# Patient Record
Sex: FEMALE | Race: WHITE | NOT HISPANIC OR LATINO | Employment: FULL TIME | URBAN - METROPOLITAN AREA
[De-identification: names, ages, dates, MRNs, and addresses within clinical notes are randomized per-mention and may not be internally consistent; named-entity substitution may affect disease eponyms.]

---

## 2017-03-27 NOTE — TELEPHONE ENCOUNTER
Was the patient seen in the last year in this department? Yes 10/05/2016    Does patient have an active prescription for medications requested? No     Received Request Via: Pharmacy

## 2017-03-27 NOTE — TELEPHONE ENCOUNTER
Janak- You have never prescribed a short acting insulin and the only one listed on med list is Novolog 70/30 mix but pharmacy is requesting just Novolog. Please refill as you see fit.

## 2017-03-27 NOTE — TELEPHONE ENCOUNTER
Pharmacy requesting refills on Novolog flexpen (D/C'd on med list), looks to have been replace with Novolog Mix.   Last OV 10/16, A1c checked 7/16, pt has met protocol.

## 2017-03-30 ENCOUNTER — TELEPHONE (OUTPATIENT)
Dept: MEDICAL GROUP | Facility: PHYSICIAN GROUP | Age: 32
End: 2017-03-30

## 2017-03-30 DIAGNOSIS — E10.8 TYPE 1 DIABETES MELLITUS WITH COMPLICATION (HCC): ICD-10-CM

## 2017-03-30 NOTE — TELEPHONE ENCOUNTER
1. Caller Name: Nita Jackman                                           Call Back Number: [phone] (home)         Patient approves a detailed voicemail message: N\A    Pt read from her medication bottle and she is taking Novolog flexpen inj 5x3ml - inject 5 units under the skin 3 times daily.  She is asking for a refill

## 2017-04-03 ENCOUNTER — TELEPHONE (OUTPATIENT)
Dept: MEDICAL GROUP | Facility: PHYSICIAN GROUP | Age: 32
End: 2017-04-03

## 2017-04-05 NOTE — TELEPHONE ENCOUNTER
Janak- From your notes I see that pt reestablished with you at this clinic. Pharmacy states that pt has always been on novolog flexpen but you recently sent a script for novolog 70/30 flexpen. Did you mean to make a change to therapy? Please clarify

## 2017-04-05 NOTE — TELEPHONE ENCOUNTER
Last refill request was sent for clarification per note in epic, pharmacy stated pt was on novolog flexpen and they received a novolog 70/30 flexpen rx

## 2017-04-06 RX ORDER — INSULIN ASPART 100 [IU]/ML
INJECTION, SOLUTION INTRAVENOUS; SUBCUTANEOUS
Qty: 45 ML | Refills: 3 | Status: SHIPPED | OUTPATIENT
Start: 2017-04-06 | End: 2018-06-13 | Stop reason: SDUPTHER

## 2017-04-06 NOTE — TELEPHONE ENCOUNTER
Pt called and states her novolog flex pen does not say 70/30 and she cannot remember ever having used a blend.

## 2017-07-13 DIAGNOSIS — F33.9 EPISODE OF RECURRENT MAJOR DEPRESSIVE DISORDER, UNSPECIFIED DEPRESSION EPISODE SEVERITY (HCC): ICD-10-CM

## 2017-07-13 RX ORDER — FLUOXETINE HYDROCHLORIDE 20 MG/1
40 CAPSULE ORAL DAILY
Qty: 180 CAP | Refills: 3 | Status: SHIPPED | OUTPATIENT
Start: 2017-07-13 | End: 2017-08-02

## 2017-08-02 ENCOUNTER — OFFICE VISIT (OUTPATIENT)
Dept: URGENT CARE | Facility: PHYSICIAN GROUP | Age: 32
End: 2017-08-02
Payer: COMMERCIAL

## 2017-08-02 VITALS
DIASTOLIC BLOOD PRESSURE: 78 MMHG | HEART RATE: 83 BPM | WEIGHT: 205 LBS | TEMPERATURE: 97.9 F | BODY MASS INDEX: 31.18 KG/M2 | OXYGEN SATURATION: 97 % | SYSTOLIC BLOOD PRESSURE: 122 MMHG | RESPIRATION RATE: 14 BRPM

## 2017-08-02 DIAGNOSIS — J01.90 ACUTE RHINOSINUSITIS: ICD-10-CM

## 2017-08-02 DIAGNOSIS — E10.9 TYPE 1 DIABETES MELLITUS WITHOUT COMPLICATION (HCC): ICD-10-CM

## 2017-08-02 PROCEDURE — 99214 OFFICE O/P EST MOD 30 MIN: CPT | Performed by: FAMILY MEDICINE

## 2017-08-02 RX ORDER — AMOXICILLIN AND CLAVULANATE POTASSIUM 875; 125 MG/1; MG/1
1 TABLET, FILM COATED ORAL 2 TIMES DAILY
Qty: 14 TAB | Refills: 0 | Status: SHIPPED | OUTPATIENT
Start: 2017-08-02 | End: 2017-08-09

## 2017-08-02 RX ORDER — PREDNISONE 10 MG/1
30 TABLET ORAL EVERY MORNING
Qty: 21 TAB | Refills: 0 | Status: SHIPPED | OUTPATIENT
Start: 2017-08-02 | End: 2017-08-09

## 2017-08-02 RX ORDER — PSEUDOEPHEDRINE HCL 120 MG/1
120 TABLET, FILM COATED, EXTENDED RELEASE ORAL EVERY MORNING
Qty: 20 TAB | Refills: 0 | Status: SHIPPED | OUTPATIENT
Start: 2017-08-02 | End: 2019-06-17

## 2017-08-02 ASSESSMENT — ENCOUNTER SYMPTOMS
ORTHOPNEA: 0
CHILLS: 0
SORE THROAT: 1
FEVER: 0
FOCAL WEAKNESS: 0
COUGH: 1
DIZZINESS: 0
SPUTUM PRODUCTION: 0

## 2017-08-02 NOTE — PROGRESS NOTES
Subjective:      Nita Jackman is a 32 y.o. female who presents with Nasal Congestion    Chief Complaint   Patient presents with   • Nasal Congestion     x 7 days R ear pain x 1 day        - This is a very pleasant 32 y.o. female with complaints of sinus congestion/pain and DC x 1 week. No NVFC. Occasional cough.    - Hx DM, sugars run 100's-low 200's. She knows how to titrate insulin.           ALLERGIES:  Percocet and Vicodin     PMH:  Past Medical History   Diagnosis Date   • Thyroid disorder    • Diabetes    • Retinopathy due to secondary diabetes (CMS-[facility])    • Asthma         MEDS:    Current outpatient prescriptions:   •  amoxicillin-clavulanate (AUGMENTIN) 875-125 MG Tab, Take 1 Tab by mouth 2 times a day for 7 days., Disp: 14 Tab, Rfl: 0  •  pseudoephedrine SR (SUDAFED 12 HOUR) 120 MG TABLET SR 12 HR, Take 1 Tab by mouth every morning., Disp: 20 Tab, Rfl: 0  •  predniSONE (DELTASONE) 10 MG Tab, Take 3 Tabs by mouth every morning for 7 days., Disp: 21 Tab, Rfl: 0  •  NOVOLOG FLEXPEN 100 UNIT/ML Solution Pen-injector solution for injection, INJECT 5 UNITS UNDER THE SKIN THREE TIMES DAILY PER PRESCRIBER'S INSTRUCTIONS. INSULIN DOSING REQUIRES INDIVIDUALIZATION, Disp: 45 mL, Rfl: 3  •  levothyroxine (SYNTHROID) 125 MCG Tab, Take 1 Tab by mouth Every morning on an empty stomach., Disp: 30 Tab, Rfl: 11  •  CYCLAFEM 1/35 1-35 MG-MCG per tablet, Take 1 Tab by mouth every day., Disp: , Rfl: 11  •  pantoprazole (PROTONIX) 40 MG Tablet Delayed Response, Take 1 Tab by mouth every day., Disp: , Rfl: 11  •  cetirizine (ZYRTEC) 10 MG TABS, Take 10 mg by mouth every day., Disp: , Rfl:     ** I have documented what I find to be significant in regards to past medical, social, family and surgical history  in my HPI or under PMH/PSH/FH review section, otherwise it is contributory **             HPI    Review of Systems   Constitutional: Negative for fever and chills.   HENT: Positive for congestion and sore throat.     Respiratory: Positive for cough. Negative for sputum production.    Cardiovascular: Negative for chest pain and orthopnea.   Neurological: Negative for dizziness and focal weakness.          Objective:     /78 mmHg  Pulse 83  Temp(Src) 36.6 °C (97.9 °F)  Resp 14  Wt 92.987 kg (205 lb)  SpO2 97%     Physical Exam   Constitutional: She appears well-developed. No distress.   HENT:   Head: Normocephalic and atraumatic.   Mouth/Throat: Oropharynx is clear and moist.   Eyes: Conjunctivae are normal.   Neck: Neck supple.   Cardiovascular: Regular rhythm.    No murmur heard.  Pulmonary/Chest: Effort normal and breath sounds normal. No respiratory distress.   Neurological: She is alert. She exhibits normal muscle tone.   Skin: Skin is warm and dry.   Psychiatric: She has a normal mood and affect. Judgment normal.   Nursing note and vitals reviewed.  boggy nasal mucosa w/ yellow DC            Assessment/Plan:         1. Acute rhinosinusitis  amoxicillin-clavulanate (AUGMENTIN) 875-125 MG Tab    pseudoephedrine SR (SUDAFED 12 HOUR) 120 MG TABLET SR 12 HR    predniSONE (DELTASONE) 10 MG Tab   2. Type 1 diabetes mellitus without complication (CMS-HCC)               Dx & d/c instructions discussed w/ patient and/or family members. Follow up w/ Prvt Dr or here in 3-4 days if not getting better, sooner if needed,  ER if worse and UC/PCP unavailable.        Possible side effects (i.e. Rash, GI upset/constipation, sedation, elevation of BP or sugars) of any medications given discussed.

## 2017-08-02 NOTE — Clinical Note
August 2, 2017         Patient: Nita Jackman   YOB: 1985   Date of Visit: 8/2/2017           To Whom it May Concern:    Nita Jackman was seen in my clinic on 8/2/2017. She may return to work 1 day.    If you have any questions or concerns, please don't hesitate to call.        Sincerely,           Ruben Floyd M.D.  Electronically Signed

## 2017-08-02 NOTE — MR AVS SNAPSHOT
Nita Jackman   2017 9:15 AM   Office Visit   MRN: [id number]    Department:  [facility]   Dept Phone:  [phone]    Description:  Female : 1985   Provider:  Ruben Floyd M.D.           Reason for Visit     Nasal Congestion x 7 days R ear pain x 1 day      Allergies as of 2017     Allergen Noted Reactions    Percocet [Oxycodone-Acetaminophen] 2011   Itching    And nausea    Vicodin [Hydrocodone-Acetaminophen] 10/29/2009         You were diagnosed with     Acute rhinosinusitis   [486518]         Vital Signs     Blood Pressure Pulse Temperature Respirations Weight Oxygen Saturation    122/78 mmHg 83 36.6 °C (97.9 °F) 14 92.987 kg (205 lb) 97%    Smoking Status                   Never Smoker            Basic Information     Date Of Birth Sex Race Ethnicity Preferred Language    1985 Female White Non- English      Your appointments     Sep 26, 2017  7:30 AM   NM-GASTRIC EMPTYING(TOUGAS METHOD) with [facility] NM [facility]   [facility]    [address] [place] [id number]   [phone]              Problem List              ICD-10-CM Priority Class Noted - Resolved    Encounter to establish care with new doctor Z71.89   2016 - Present    Type 1 diabetes mellitus without complication (CMS-HCC) E10.9   2016 - Present    Overweight E66.3   2016 - Present    Depression F32.9   2016 - Present    Sinusitis J32.9   10/5/2016 - Present    Abscess of scalp L02.811   10/5/2016 - Present    BMI 31.0-31.9,adult Z68.31   10/5/2016 - Present      Health Maintenance        Date Due Completion Dates    IMM HEP B VACCINE (1 of 3 - Primary Series) 1984 ---    DIABETES MONOFILAMENT / LE EXAM 1984 ---    RETINAL SCREENING 2002 ---    FASTING LIPID PROFILE 2002 ---    URINE ACR / MICROALBUMIN 2002 ---    IMM PNEUMOCOCCAL 19-64 (ADULT) MEDIUM RISK SERIES (1 of 1 - PPSV23) 2003 ---    PAP  SMEAR 4/23/2005 ---    SERUM CREATININE 3/15/2013 3/15/2012, 5/23/2011, 10/29/2009    A1C SCREENING 1/12/2017 7/12/2016    IMM INFLUENZA (1) 9/1/2017 ---    IMM DTaP/Tdap/Td Vaccine (2 - Td) 1/2/2025 1/2/2015            Current Immunizations     Tdap Vaccine 1/2/2015 12:05 PM      Below and/or attached are the medications your provider expects you to take. Review all of your home medications and newly ordered medications with your provider and/or pharmacist. Follow medication instructions as directed by your provider and/or pharmacist. Please keep your medication list with you and share with your provider. Update the information when medications are discontinued, doses are changed, or new medications (including over-the-counter products) are added; and carry medication information at all times in the event of emergency situations     Allergies:  PERCOCET - Itching     VICODIN - (reactions not documented)               Medications  Valid as of: August 02, 2017 -  9:26 AM    Generic Name Brand Name Tablet Size Instructions for use    Amoxicillin-Pot Clavulanate (Tab) AUGMENTIN 875-125 MG Take 1 Tab by mouth 2 times a day for 7 days.        Cetirizine HCl (Tab) ZYRTEC 10 MG Take 10 mg by mouth every day.        Insulin Aspart (Solution Pen-injector) NOVOLOG FLEXPEN 100 UNIT/ML INJECT 5 UNITS UNDER THE SKIN THREE TIMES DAILY PER PRESCRIBER'S INSTRUCTIONS. INSULIN DOSING REQUIRES INDIVIDUALIZATION        Levothyroxine Sodium (Tab) SYNTHROID 125 MCG Take 1 Tab by mouth Every morning on an empty stomach.        Norethindrone-Eth Estradiol (Tab) CYCLAFEM 1/35 1-35 MG-MCG Take 1 Tab by mouth every day.        Pantoprazole Sodium (Tablet Delayed Response) PROTONIX 40 MG Take 1 Tab by mouth every day.        PredniSONE (Tab) DELTASONE 10 MG Take 3 Tabs by mouth every morning for 7 days.        Pseudoephedrine HCl (TABLET SR 12 HR) SUDAFED  MG Take 1 Tab by mouth every morning.        .                 Medicines  prescribed today were sent to:     [id number]  [place], [place] - [address] AT [facility] [address]    [address] [place] [address]    Phone: [phone] Fax: [phone]    Open 24 Hours?: No      Medication refill instructions:       If your prescription bottle indicates you have medication refills left, it is not necessary to call your provider’s office. Please contact your pharmacy and they will refill your medication.    If your prescription bottle indicates you do not have any refills left, you may request refills at any time through one of the following ways: The online [employer] system (except Urgent Care), by calling your provider’s office, or by asking your pharmacy to contact your provider’s office with a refill request. Medication refills are processed only during regular business hours and may not be available until the next business day. Your provider may request additional information or to have a follow-up visit with you prior to refilling your medication.   *Please Note: Medication refills are assigned a new Rx number when refilled electronically. Your pharmacy may indicate that no refills were authorized even though a new prescription for the same medication is available at the pharmacy. Please request the medicine by name with the pharmacy before contacting your provider for a refill.           [employer] Access Code: Activation code not generated  Current [employer] Status: Active

## 2017-08-16 ENCOUNTER — TELEPHONE (OUTPATIENT)
Dept: MEDICAL GROUP | Facility: PHYSICIAN GROUP | Age: 32
End: 2017-08-16

## 2017-08-23 ENCOUNTER — TELEPHONE (OUTPATIENT)
Dept: MEDICAL GROUP | Facility: PHYSICIAN GROUP | Age: 32
End: 2017-08-23

## 2017-08-23 NOTE — TELEPHONE ENCOUNTER
ESTABLISHED PATIENT PRE-VISIT PLANNING     Note: Patient will not be contacted if there is no indication to call.     1.  Reviewed notes from the last few office visits within the medical group: Yes    2.  If any orders were placed at last visit or intended to be done for this visit (i.e. 6 mos follow-up), do we have Results/Consult Notes?        •  Labs - Labs ordered, completed and results were requested from  [facility].       •  Imaging - Imaging was not ordered at last office visit.       •  Referrals - No referrals were ordered at last office visit.    3. Is this appointment scheduled as a Hospital Follow-Up? No      5.  Patient is due for the following Health Maintenance Topics:   Health Maintenance Due   Topic Date Due   • IMM HEP B VACCINE (1 of 3 - Primary Series) 1985   • DIABETES MONOFILAMENT / LE EXAM  1985   • RETINAL SCREENING  04/23/2002   • FASTING LIPID PROFILE  04/23/2002   • URINE ACR / MICROALBUMIN  04/23/2002   • IMM PNEUMOCOCCAL 19-64 (ADULT) MEDIUM RISK SERIES (1 of 1 - PPSV23) 04/23/2003   • PAP SMEAR  04/23/2005   • SERUM CREATININE  03/15/2013   • A1C SCREENING  01/12/2017   • IMM INFLUENZA (1) 09/01/2017           6.  Patient was informed to arrive 15 min prior to their scheduled appointment and bring in their medication bottles.

## 2017-08-24 ENCOUNTER — OFFICE VISIT (OUTPATIENT)
Dept: MEDICAL GROUP | Facility: PHYSICIAN GROUP | Age: 32
End: 2017-08-24
Payer: COMMERCIAL

## 2017-08-24 VITALS
DIASTOLIC BLOOD PRESSURE: 80 MMHG | HEIGHT: 68 IN | WEIGHT: 206 LBS | RESPIRATION RATE: 16 BRPM | HEART RATE: 68 BPM | OXYGEN SATURATION: 98 % | BODY MASS INDEX: 31.22 KG/M2 | SYSTOLIC BLOOD PRESSURE: 112 MMHG | TEMPERATURE: 98 F

## 2017-08-24 DIAGNOSIS — F32.A DEPRESSION, UNSPECIFIED DEPRESSION TYPE: ICD-10-CM

## 2017-08-24 DIAGNOSIS — E10.9 TYPE 1 DIABETES MELLITUS WITHOUT COMPLICATION (HCC): ICD-10-CM

## 2017-08-24 PROCEDURE — 99214 OFFICE O/P EST MOD 30 MIN: CPT | Performed by: NURSE PRACTITIONER

## 2017-08-24 RX ORDER — FLUOXETINE HYDROCHLORIDE 20 MG/1
20 CAPSULE ORAL DAILY
COMMUNITY
End: 2018-07-31

## 2017-08-24 RX ORDER — BUPROPION HYDROCHLORIDE 150 MG/1
150 TABLET ORAL EVERY MORNING
Qty: 90 TAB | Refills: 3 | Status: SHIPPED | OUTPATIENT
Start: 2017-08-24 | End: 2018-08-22 | Stop reason: SDUPTHER

## 2017-08-24 ASSESSMENT — PATIENT HEALTH QUESTIONNAIRE - PHQ9
5. POOR APPETITE OR OVEREATING: 3 - NEARLY EVERY DAY
CLINICAL INTERPRETATION OF PHQ2 SCORE: 3
SUM OF ALL RESPONSES TO PHQ QUESTIONS 1-9: 15

## 2017-08-24 NOTE — ASSESSMENT & PLAN NOTE
Mood has been down, stressed out a lot.  Doesn't think prozac is working well enough.  Discussed adding wellbutrin.  Also discussed the possibility of seeing a counselor, specifically to discuss depression as it relates to chronic disease.

## 2017-08-24 NOTE — MR AVS SNAPSHOT
"Nita Jackman   2017 11:40 AM   Office Visit   MRN: [id number]    Department:  [facility]   Dept Phone:  [phone]    Description:  Female : 1985   Provider:  LORENZO Herring           Reason for Visit     Follow-Up labs       Allergies as of 2017     Allergen Noted Reactions    Percocet [Oxycodone-Acetaminophen] 2011   Itching    And nausea    Vicodin [Hydrocodone-Acetaminophen] 10/29/2009         You were diagnosed with     BMI 31.0-31.9,adult   [633498]       Depression, unspecified depression type   [4925081]       Type 1 diabetes mellitus without complication (CMS-HCC)   [524748]         Vital Signs     Blood Pressure Pulse Temperature Respirations Height Weight    112/80 mmHg 68 36.7 °C (98 °F) 16 1.727 m (5' 7.99\") 93.441 kg (206 lb)    Body Mass Index Oxygen Saturation Last Menstrual Period Breastfeeding? Smoking Status       31.33 kg/m2 98% 08/10/2017 No Never Smoker        Basic Information     Date Of Birth Sex Race Ethnicity Preferred Language    1985 Female White Non- English      Your appointments     Sep 26, 2017  7:30 AM   NM-GASTRIC EMPTYING(TOUGAS METHOD) with [facility] NM ECAM   [facility] - N[facility] ([facility])    [address] [place] [id number] [phone]              Problem List              ICD-10-CM Priority Class Noted - Resolved    Type 1 diabetes mellitus without complication (CMS-HCC) E10.9   2016 - Present    Overweight E66.3   2016 - Present    Depression F32.9   2016 - Present    BMI 31.0-31.9,adult Z68.31   10/5/2016 - Present      Health Maintenance        Date Due Completion Dates    IMM HEP B VACCINE (1 of 3 - Primary Series) 1984 ---    DIABETES MONOFILAMENT / LE EXAM 1984 ---    RETINAL SCREENING 2002 ---    FASTING LIPID PROFILE 2002 ---    URINE ACR / MICROALBUMIN 2002 ---    IMM PNEUMOCOCCAL 19-64 (ADULT) MEDIUM RISK SERIES (1 of 1 - " PPSV23) 4/23/2003 ---    PAP SMEAR 4/23/2005 ---    SERUM CREATININE 3/15/2013 3/15/2012, 5/23/2011, 10/29/2009    A1C SCREENING 1/12/2017 7/12/2016    IMM INFLUENZA (1) 9/1/2017 ---    IMM DTaP/Tdap/Td Vaccine (2 - Td) 1/2/2025 1/2/2015            Current Immunizations     Tdap Vaccine 1/2/2015 12:05 PM      Below and/or attached are the medications your provider expects you to take. Review all of your home medications and newly ordered medications with your provider and/or pharmacist. Follow medication instructions as directed by your provider and/or pharmacist. Please keep your medication list with you and share with your provider. Update the information when medications are discontinued, doses are changed, or new medications (including over-the-counter products) are added; and carry medication information at all times in the event of emergency situations     Allergies:  PERCOCET - Itching     VICODIN - (reactions not documented)               Medications  Valid as of: August 24, 2017 -  1:11 PM    Generic Name Brand Name Tablet Size Instructions for use    BuPROPion HCl (TABLET SR 24 HR) WELLBUTRIN  MG Take 1 Tab by mouth every morning.        Cetirizine HCl (Tab) ZYRTEC 10 MG Take 10 mg by mouth every day.        FLUoxetine HCl (Cap) PROZAC 20 MG Take 20 mg by mouth every day.        Insulin Aspart (Solution Pen-injector) NOVOLOG FLEXPEN 100 UNIT/ML INJECT 5 UNITS UNDER THE SKIN THREE TIMES DAILY PER PRESCRIBER'S INSTRUCTIONS. INSULIN DOSING REQUIRES INDIVIDUALIZATION        Levothyroxine Sodium (Tab) SYNTHROID 125 MCG Take 1 Tab by mouth Every morning on an empty stomach.        Norethindrone-Eth Estradiol (Tab) CYCLAFEM 1/35 1-35 MG-MCG Take 1 Tab by mouth every day.        Pantoprazole Sodium (Tablet Delayed Response) PROTONIX 40 MG Take 1 Tab by mouth every day.        Pseudoephedrine HCl (TABLET SR 12 HR) SUDAFED  MG Take 1 Tab by mouth every morning.        .                 Medicines  prescribed today were sent to:     [id number]  [place], [place] - [address] AT [facility] [address]    [address] [place] [address]    Phone: [phone] Fax: [phone]    Open 24 Hours?: No      Medication refill instructions:       If your prescription bottle indicates you have medication refills left, it is not necessary to call your provider’s office. Please contact your pharmacy and they will refill your medication.    If your prescription bottle indicates you do not have any refills left, you may request refills at any time through one of the following ways: The online [employer] system (except Urgent Care), by calling your provider’s office, or by asking your pharmacy to contact your provider’s office with a refill request. Medication refills are processed only during regular business hours and may not be available until the next business day. Your provider may request additional information or to have a follow-up visit with you prior to refilling your medication.   *Please Note: Medication refills are assigned a new Rx number when refilled electronically. Your pharmacy may indicate that no refills were authorized even though a new prescription for the same medication is available at the pharmacy. Please request the medicine by name with the pharmacy before contacting your provider for a refill.           [employer] Access Code: Activation code not generated  Current [employer] Status: Active

## 2017-08-24 NOTE — ASSESSMENT & PLAN NOTE
Currently on 40 units of Tresiba in the evening, and mealtime Novolog.  Sugars are typically in the 200s.  Had labs done a few months ago.  Discussed that I would like her sugars a little lower.  Patient understands.

## 2017-08-30 DIAGNOSIS — F32.A DEPRESSION, UNSPECIFIED DEPRESSION TYPE: ICD-10-CM

## 2017-09-04 DIAGNOSIS — E10.8 TYPE 1 DIABETES MELLITUS WITH COMPLICATION (HCC): ICD-10-CM

## 2017-09-13 NOTE — PROGRESS NOTES
Chief Complaint   Patient presents with   • Follow-Up     labs        HISTORY OF PRESENT ILLNESS: Patient is a 32 y.o. female established patient who presents today to discuss the following issues:    Type 1 diabetes mellitus without complication  Currently on 40 units of Tresiba in the evening, and mealtime Novolog.  Sugars are typically in the 200s.  Had labs done a few months ago.  Discussed that I would like her sugars a little lower.  Patient understands.    Depression  Mood has been down, stressed out a lot.  Doesn't think prozac is working well enough.  Discussed adding wellbutrin.  Also discussed the possibility of seeing a counselor, specifically to discuss depression as it relates to chronic disease.    BMI 31.0-31.9,adult  Patient is aware of BMI elevation.  Brief discussion of diet, exercise, and lifestyle modification.        Patient Active Problem List    Diagnosis Date Noted   • BMI 31.0-31.9,adult 10/05/2016   • Type 1 diabetes mellitus without complication (CMS-[facility]) 07/12/2016   • Depression 07/12/2016       Allergies:Percocet [oxycodone-acetaminophen] and Vicodin [hydrocodone-acetaminophen]    Current Outpatient Prescriptions   Medication Sig Dispense Refill   • fluoxetine (PROZAC) 20 MG Cap Take 20 mg by mouth every day.     • buPROPion (WELLBUTRIN XL) 150 MG XL tablet Take 1 Tab by mouth every morning. 90 Tab 3   • NOVOLOG FLEXPEN 100 UNIT/ML Solution Pen-injector solution for injection INJECT 5 UNITS UNDER THE SKIN THREE TIMES DAILY PER PRESCRIBER'S INSTRUCTIONS. INSULIN DOSING REQUIRES INDIVIDUALIZATION 45 mL 3   • levothyroxine (SYNTHROID) 125 MCG Tab Take 1 Tab by mouth Every morning on an empty stomach. 30 Tab 11   • CYCLAFEM 1/35 1-35 MG-MCG per tablet Take 1 Tab by mouth every day.  11   • pantoprazole (PROTONIX) 40 MG Tablet Delayed Response Take 1 Tab by mouth every day.  11   • cetirizine (ZYRTEC) 10 MG TABS Take 10 mg by mouth every day.     • glucose blood strip 1 Strip by Other route  "as needed. 100 Strip 99   • pseudoephedrine SR (SUDAFED 12 HOUR) 120 MG TABLET SR 12 HR Take 1 Tab by mouth every morning. 20 Tab 0     No current facility-administered medications for this visit.        Social History   Substance Use Topics   • Smoking status: Never Smoker   • Smokeless tobacco: Never Used   • Alcohol use 0.6 oz/week     1 Glasses of wine per week       Family Status   Relation Status   • Mother Alive   • Father Alive     Family History   Problem Relation Age of Onset   • Hyperlipidemia Father    • Diabetes     • Hypertension     • Stroke         Review of Systems:   Constitutional: Negative for fever, chills, weight loss and malaise/fatigue.   HENT: Negative for ear pain, nosebleeds, congestion, sore throat and neck pain.    Eyes: Negative for blurred vision.   Respiratory: Negative for cough, sputum production, shortness of breath and wheezing.    Cardiovascular: Negative for chest pain, palpitations, orthopnea and leg swelling.   Gastrointestinal: Negative for heartburn, nausea, vomiting and abdominal pain.   Genitourinary: Negative for dysuria, urgency and frequency.   Musculoskeletal: Negative for myalgias, joint pain, and back pain.  Skin: Negative for rash and itching.   Neurological: Negative for dizziness, tingling, tremors, sensory change, focal weakness and headaches.   Endo/Heme/Allergies: Does not bruise/bleed easily.   Psychiatric/Behavioral: Positive for depression and anxiety.  Denies SI/HI.  All other systems reviewed and are negative except as in HPI.    Exam:  Blood pressure 112/80, pulse 68, temperature 36.7 °C (98 °F), resp. rate 16, height 1.727 m (5' 7.99\"), weight 93.4 kg (206 lb), last menstrual period 08/10/2017, SpO2 98 %, not currently breastfeeding.  General:  Well nourished, well developed female in NAD  Head: Grossly normal.  Neck: Supple without JVD or bruit. Thyroid is not enlarged.  Pulmonary: Clear to ausculation. Normal effort. No rales, ronchi, or " wheezing.  Cardiovascular: Regular rate and rhythm without murmur.   Extremities: No clubbing, cyanosis, or edema.  Skin: Intact with no obvious rashes or lesions.  Neuro: Grossly intact.  Psych: Alert and oriented x 3.  Mood and affect appropriate.    Medical decision-making and discussion: Nita is here today for follow-up.  She was given a prescription for wellbutrin, and she will reach out on MyChart to let me know how she is doing.  She will follow-up here as needed.          Assessment/Plan:  1. BMI 31.0-31.9,adult  Patient identified as having weight management issue.  Appropriate orders and counseling given.   2. Depression, unspecified depression type  Patient has been identified as being depressed and appropriate orders and counseling have been given    buPROPion (WELLBUTRIN XL) 150 MG XL tablet   3. Type 1 diabetes mellitus without complication (CMS-HCC)         Return if symptoms worsen or fail to improve.    Please note that this dictation was created using voice recognition software. I have made every reasonable attempt to correct obvious errors, but I expect that there are errors of grammar and possibly content that I did not discover before finalizing the note.

## 2017-09-18 ENCOUNTER — TELEPHONE (OUTPATIENT)
Dept: MEDICAL GROUP | Facility: PHYSICIAN GROUP | Age: 32
End: 2017-09-18

## 2017-09-18 DIAGNOSIS — E10.8 TYPE 1 DIABETES MELLITUS WITH COMPLICATION (HCC): ICD-10-CM

## 2017-09-18 NOTE — TELEPHONE ENCOUNTER
VOICEMAIL  1. Caller Name: Nita Jackman                        Call Back Number: [phone] (home)       2. Message: pt states she asked for two glucometers and test strips at beginning of month and she has not heard anything since.  She states the one she broke was an Accu-Check    3. Patient approves office to leave a detailed voicemail/MyChart message: N\A

## 2017-09-26 ENCOUNTER — HOSPITAL ENCOUNTER (OUTPATIENT)
Dept: RADIOLOGY | Facility: MEDICAL CENTER | Age: 32
End: 2017-09-26
Attending: INTERNAL MEDICINE
Payer: COMMERCIAL

## 2017-10-11 ENCOUNTER — NON-PROVIDER VISIT (OUTPATIENT)
Dept: HEALTH INFORMATION MANAGEMENT | Facility: MEDICAL CENTER | Age: 32
End: 2017-10-11
Payer: COMMERCIAL

## 2017-10-11 VITALS — WEIGHT: 207.5 LBS | HEIGHT: 68 IN | BODY MASS INDEX: 31.45 KG/M2

## 2017-10-11 DIAGNOSIS — E10.8 TYPE 1 DIABETES MELLITUS WITH COMPLICATION (HCC): ICD-10-CM

## 2017-10-11 PROCEDURE — 97802 MEDICAL NUTRITION INDIV IN: CPT | Performed by: DIETITIAN, REGISTERED

## 2017-10-11 NOTE — PROGRESS NOTES
"10/11/2017 32 y.o.   Referring Provider: LORENZO Duron       Time in/out:  2:30-3:30pm     Anthropometrics/Objective  Weight: 207.8lb   Stated Goal Weight: -40lb (160lb)  Estimated Daily Caloric needs: 1700Kcal based on [place]    See comprehensive patient history form for further information     Subjective:  Pt states she has had Type 1 Diabetes since she was 7.5 years old. She used to attend diabetes camp and has had ample education on carb counting, however would like a review. Admits that her diet has gotten off track and she states that she has a unhealthy relationship with food. She is asking for a set meal plan with exchanges, as she did at diabetes in [place]. She feels that will help her to learn to \"eat to live\" and provide more structure. She wants to lose weight. Admits to having an addiction to sweets and is eating them daily right now.   Has tried gluten free diet and low carb diets but they have not worked for her long term.   Reports checking FSBS 1-2x a day. Worries she doesn't have enough strips to check more often than that. Reports levels have been really high and then also has a couple lows.   Insulin -- Tresiba 40units HS, and Novolog 1:12 with meals (carb counting)      Nutrition Diagnosis (PES Statement)  · Altered nutrition related lab values related to endocrine dysfunction as evidenced by HgbA1c of 10.7 per referral notes   ·     Client history:  Condition(s) associated with a diagnosis or treatment (specify) Type 1 Diabetes, Hypothyroidism.     Biochemical data, medical test and procedures  Lab Results   Component Value Date/Time    HBA1C 12.2 07/12/2016 10:15 AM   @  Lab Results   Component Value Date/Time    POCGLUCOSE 240 (H) 03/15/2012 04:05 PM     No results found for: CHOLSTRLTOT, LDL, HDL, TRIGLYCERIDE      Nutrition Intervention  Nutrition Prescription  Recommended Daily Kcals 1700kcal   Recommended Daily Protein: 95-100g     Meal and Snack  Recommend a general/healthful " diet with carb counting  Meal plan:   8 starch, 3 fruit, 10 protein, 4 fat, 4 veggies     Comprehensive Nutrition education Instruction or training leading to in-depth nutrition related knowledge about:  Benefits to following meal plan, Combine carb, protein and fat at each meal, Meal timing and spacing, Menu Planning, Metabolism of carb, protein, fat, Physical activity/exercise, Portion control, Sweets and alcohol in moderation and Label Reading    Monitoring & Evaluation Plan    Behavioral-Environmental:  Behavior : Begin to measure foods out with measuring cups to verify portions.   Check FSBS 3 times a day. Fasting and before a meal and HS.     Food / Nutrient Intake:  Food intake : Follow 1700kcal meal plan which will provide ~45% carb, 25% protein, 30% fat. Consume 3 meals and 2 snacks. Limit sweets to no more than 3 per week.     Physical Signs / Symptoms:  HbA1c profiles <7% or per MD guidelines. and Weight change -0.5-1lb per week     Assessment Notes:  Provide pt with a meal plan and reviewed the exchanges. This meal plan will provide ~45g total carbs per meal and 15 g per snack. It will be higher in protein to help with satiety. Pt will continue to carb count and follow current insulin to carb ratio as rx'd by her MD (1:12g CHO).   If despite getting better control and consistency in her diet she is still having poor glycemic control, recommend that pt be referred to Endocrinology and she may benefit from a continuous glucose monitor (CGM).     Follow up 3 weeks   Brittany Hastings, RD, LD, CDE  [phone]

## 2017-10-31 ENCOUNTER — NON-PROVIDER VISIT (OUTPATIENT)
Dept: HEALTH INFORMATION MANAGEMENT | Facility: MEDICAL CENTER | Age: 32
End: 2017-10-31
Payer: COMMERCIAL

## 2017-10-31 VITALS — HEIGHT: 68 IN | WEIGHT: 212.6 LBS | BODY MASS INDEX: 32.22 KG/M2

## 2017-10-31 DIAGNOSIS — E10.9 TYPE 1 DIABETES MELLITUS WITHOUT COMPLICATION (HCC): ICD-10-CM

## 2017-10-31 PROCEDURE — 97803 MED NUTRITION INDIV SUBSEQ: CPT | Performed by: DIETITIAN, REGISTERED

## 2017-10-31 NOTE — PROGRESS NOTES
"Nutrition Reassess    10/31/2017    Janak GALLO [place], OLI.   32 y.o.   Time: in/out 2:00-2:30pm       Subjective:   Nita states that she has been following the meal plan well for the past few weeks. However last week she was having mood swings and cravings surrounding her period which resulted in over eating and indulging in sweets and late night snacking.   She was feeling much better when she was following the meal plan and saw an improvement in her blood sugars as well. FSBS  Fasting.   She continues to count carbs and use 1:12 insulin to carb ratio at B and L, however at dinner is trying 1:15 bc she has had a couple lows blood sugars over night. Pt doesn't often eat after dinner.   Has limited alcohol consumption but last week had 1-2 alcoholic beverages.   She would like to get back to her meal plan. Is focusing on protein, vegetables, fruits, and whole grains    Anthropometrics/Objective    Vitals:    10/31/17 1443   Weight: 96.4 kg (212 lb 9.6 oz)   Height: 1.727 m (5' 8\")     Body mass index is 32.33 kg/m².    Previous weight/date: 207.8lb  Change : +4.8lb        Stated Goal Weight: -40lb (160lb)   BG Values: 95-110mg/dL fasting   Medication changes: none     Diet Recall  Time   7am :B  Protein shake with powder, yogurt, almond milk, and 1 serving fruit    10am :S  Nuts or yogurt or cheese    12pm :L  1 wheat tortilla with meat and cheese and mustard    4pm:S  Same as above    6-6:30 pm :D  Chicken with vegetables and all portion of rice or starchy vegetable like peas or sweet potato    :S  None     ReAssesment/Notes:  Nita has gained some weight however is on her menstrual cycle and is having bloating today. She also has been eating more sweets and larger portions at meals due to craving and hormonal influence. However prior to this past week she was feeling very good on a healthy eating pattern of 3 meals and 2 snacks. She realized how her energy and blood sugars were both improved by eating " more balanced meals and not waiting til after work to have all her calories. She is motivated to get back on track and does feel the meal plan is effective for her. It will just take some time to work on the kinks and make the behavior changes more permanent. We discussed getting back on track today. And suggested ways to deal with the cravings and hormonal mood changes. We will meet during the Holidays as well to offer support and accountability.     Follow-up: 4-6 weeks

## 2017-11-03 ENCOUNTER — OFFICE VISIT (OUTPATIENT)
Dept: BEHAVIORAL HEALTH | Facility: PHYSICIAN GROUP | Age: 32
End: 2017-11-03
Payer: COMMERCIAL

## 2017-11-03 DIAGNOSIS — F32.A DEPRESSION, UNSPECIFIED DEPRESSION TYPE: ICD-10-CM

## 2017-11-03 PROCEDURE — 90791 PSYCH DIAGNOSTIC EVALUATION: CPT | Performed by: SOCIAL WORKER
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
## 2017-11-12 DIAGNOSIS — Z76.89 ENCOUNTER TO ESTABLISH CARE WITH NEW DOCTOR: ICD-10-CM

## 2017-11-13 RX ORDER — LEVOTHYROXINE SODIUM 0.12 MG/1
TABLET ORAL
Qty: 90 TAB | Refills: 1 | Status: SHIPPED | OUTPATIENT
Start: 2017-11-13 | End: 2018-05-20 | Stop reason: SDUPTHER

## 2018-01-08 ENCOUNTER — TELEPHONE (OUTPATIENT)
Dept: MEDICAL GROUP | Facility: PHYSICIAN GROUP | Age: 33
End: 2018-01-08

## 2018-01-08 DIAGNOSIS — E10.8 TYPE 1 DIABETES MELLITUS WITH COMPLICATION (HCC): ICD-10-CM

## 2018-01-08 NOTE — TELEPHONE ENCOUNTER
----- Message from Nita Jackman sent at 1/6/2018  2:44 PM PST -----  Regarding: Prescription Question  Contact: [phone]  Gerardo fisher,     So when you prescribed me my new glucometer, it didn't come with strips. I've been buying them from [employer] but they are getting expensive. Can you please request a 3 month supply or the most you can do through my insurance? I have the Freestyle lite.  Hope you all had a Merry Eusebio and Happy New Year!  Thanks, Nita

## 2018-01-16 ENCOUNTER — TELEPHONE (OUTPATIENT)
Dept: MEDICAL GROUP | Facility: PHYSICIAN GROUP | Age: 33
End: 2018-01-16

## 2018-01-29 NOTE — TELEPHONE ENCOUNTER
Her insurance may have changed their formulary.  She will need to find out which brand is now preferred.  Thanks.

## 2018-04-16 NOTE — TELEPHONE ENCOUNTER
*Tresiba is currently D/C'd on pt med list*  Was the patient seen in the last year in this department? Yes     Does patient have an active prescription for medications requested? No     Received Request Via: Pharmacy    Pt met protocol?: Yes     Last OV 08/2017  Lab Results   Component Value Date/Time    HBA1C 12.2 07/12/2016 10:15 AM      No results found for: CHOLSTRLTOT, TRIGLYCERIDE, HDL, LDL, CHOLHDLRAT, NONHDL

## 2018-04-17 RX ORDER — INSULIN DEGLUDEC 100 U/ML
INJECTION, SOLUTION SUBCUTANEOUS
Qty: 15 PEN | Refills: 3 | Status: SHIPPED | OUTPATIENT
Start: 2018-04-17 | End: 2018-11-30 | Stop reason: SDUPTHER

## 2018-05-20 ENCOUNTER — TELEPHONE (OUTPATIENT)
Dept: MEDICAL GROUP | Facility: PHYSICIAN GROUP | Age: 33
End: 2018-05-20

## 2018-05-20 DIAGNOSIS — E03.9 HYPOTHYROIDISM, UNSPECIFIED TYPE: ICD-10-CM

## 2018-05-20 DIAGNOSIS — E10.9 TYPE 1 DIABETES MELLITUS WITHOUT COMPLICATION (HCC): ICD-10-CM

## 2018-05-20 DIAGNOSIS — Z76.89 ENCOUNTER TO ESTABLISH CARE WITH NEW DOCTOR: ICD-10-CM

## 2018-05-21 RX ORDER — LEVOTHYROXINE SODIUM 0.12 MG/1
TABLET ORAL
Qty: 90 TAB | Refills: 0 | Status: SHIPPED | OUTPATIENT
Start: 2018-05-21 | End: 2018-09-04 | Stop reason: SDUPTHER

## 2018-05-21 NOTE — TELEPHONE ENCOUNTER
Please remind pt to get labs done. If pt uses outside labs please send. Will send 3 months to pharmacy.

## 2018-05-21 NOTE — TELEPHONE ENCOUNTER
Was the patient seen in the last year in this department? Yes     Does patient have an active prescription for medications requested? No     Received Request Via: Pharmacy    Pt met protocol?: Yes     Last OV 08/2017  No results found for: TSH

## 2018-06-14 ENCOUNTER — TELEPHONE (OUTPATIENT)
Dept: MEDICAL GROUP | Facility: PHYSICIAN GROUP | Age: 33
End: 2018-06-14

## 2018-06-14 RX ORDER — INSULIN ASPART 100 [IU]/ML
INJECTION, SOLUTION INTRAVENOUS; SUBCUTANEOUS
Qty: 45 ML | Refills: 0 | Status: SHIPPED | OUTPATIENT
Start: 2018-06-14 | End: 2018-10-19 | Stop reason: SDUPTHER

## 2018-06-14 NOTE — TELEPHONE ENCOUNTER
Last seen by PCP 8/17. Will send 1 fill to pharmacy.  Please advise patient to make an appointment for a follow up and get labs done for further refills.  On   07/16  Lab Results   Component Value Date/Time    HBA1C 12.2 07/12/2016 10:15 AM      No results found for: MICROALBCALC, MALBCRT, MALBEXCR, LLTXVS62, MICROALBUR, MICRALB, UMICROALBUM, MICROALBTIM   Lab Results   Component Value Date/Time    ALKPHOSPHAT 69 10/29/2009 01:00 PM    ASTSGOT 11 (L) 10/29/2009 01:00 PM    ALTSGPT 9 10/29/2009 01:00 PM    TBILIRUBIN 0.5 10/29/2009 01:00 PM

## 2018-06-14 NOTE — TELEPHONE ENCOUNTER
Received prior auth for pt's Novolog.  States preferred alternative is Humalog.  If change not appropriate can do prior auth.

## 2018-06-18 NOTE — TELEPHONE ENCOUNTER
Please proceed with prior auth.  Patient is a Type 1 diabetic who needs to stay on the medications that she is stable on.  Thanks!

## 2018-06-21 NOTE — TELEPHONE ENCOUNTER
FINAL PRIOR AUTHORIZATION STATUS:    1.  Name of Medication & Dose: Novolog Flexpen     2. Prior Auth Status: Approved through 06/21/2020     3. Action Taken: Pharmacy Notified: yes Patient Notified: no

## 2018-06-23 LAB
ALBUMIN SERPL-MCNC: 4 G/DL (ref 3.5–5.5)
ALBUMIN/CREAT UR: 8.7 MG/G CREAT (ref 0–30)
ALBUMIN/GLOB SERPL: 1.5 {RATIO} (ref 1.2–2.2)
ALP SERPL-CCNC: 93 IU/L (ref 39–117)
ALT SERPL-CCNC: 12 IU/L (ref 0–32)
AST SERPL-CCNC: 13 IU/L (ref 0–40)
BILIRUB SERPL-MCNC: 0.3 MG/DL (ref 0–1.2)
BUN SERPL-MCNC: 14 MG/DL (ref 6–20)
BUN/CREAT SERPL: 16 (ref 9–23)
CALCIUM SERPL-MCNC: 9.4 MG/DL (ref 8.7–10.2)
CHLORIDE SERPL-SCNC: 104 MMOL/L (ref 96–106)
CO2 SERPL-SCNC: 23 MMOL/L (ref 20–29)
CREAT SERPL-MCNC: 0.9 MG/DL (ref 0.57–1)
CREAT UR-MCNC: 81.6 MG/DL
GFR SERPLBLD CREATININE-BSD FMLA CKD-EPI: 84 ML/MIN/1.73
GFR SERPLBLD CREATININE-BSD FMLA CKD-EPI: 96 ML/MIN/1.73
GLOBULIN SER CALC-MCNC: 2.7 G/DL (ref 1.5–4.5)
GLUCOSE SERPL-MCNC: 126 MG/DL (ref 65–99)
HBA1C MFR BLD: 10 % (ref 4.8–5.6)
MICROALBUMIN UR-MCNC: 7.1 UG/ML
POTASSIUM SERPL-SCNC: 4.7 MMOL/L (ref 3.5–5.2)
PROT SERPL-MCNC: 6.7 G/DL (ref 6–8.5)
SODIUM SERPL-SCNC: 139 MMOL/L (ref 134–144)
T4 FREE SERPL-MCNC: 1.48 NG/DL (ref 0.82–1.77)
TSH SERPL DL<=0.005 MIU/L-ACNC: 1.27 UIU/ML (ref 0.45–4.5)

## 2018-07-31 ENCOUNTER — OFFICE VISIT (OUTPATIENT)
Dept: MEDICAL GROUP | Facility: PHYSICIAN GROUP | Age: 33
End: 2018-07-31
Payer: COMMERCIAL

## 2018-07-31 VITALS
OXYGEN SATURATION: 97 % | BODY MASS INDEX: 32.58 KG/M2 | TEMPERATURE: 97.7 F | SYSTOLIC BLOOD PRESSURE: 150 MMHG | HEART RATE: 86 BPM | WEIGHT: 215 LBS | RESPIRATION RATE: 16 BRPM | HEIGHT: 68 IN | DIASTOLIC BLOOD PRESSURE: 80 MMHG

## 2018-07-31 DIAGNOSIS — F32.A DEPRESSION, UNSPECIFIED DEPRESSION TYPE: ICD-10-CM

## 2018-07-31 DIAGNOSIS — E10.49 OTHER DIABETIC NEUROLOGICAL COMPLICATION ASSOCIATED WITH TYPE 1 DIABETES MELLITUS (HCC): ICD-10-CM

## 2018-07-31 DIAGNOSIS — E10.9 TYPE 1 DIABETES MELLITUS WITHOUT COMPLICATION (HCC): ICD-10-CM

## 2018-07-31 PROBLEM — E11.40 DIABETIC NEUROPATHY (HCC): Status: ACTIVE | Noted: 2018-07-31

## 2018-07-31 PROCEDURE — 99214 OFFICE O/P EST MOD 30 MIN: CPT | Performed by: NURSE PRACTITIONER

## 2018-07-31 RX ORDER — GABAPENTIN 300 MG/1
300 CAPSULE ORAL 3 TIMES DAILY PRN
Qty: 90 CAP | Refills: 5 | Status: SHIPPED | OUTPATIENT
Start: 2018-07-31 | End: 2019-08-06 | Stop reason: SDUPTHER

## 2018-07-31 ASSESSMENT — PATIENT HEALTH QUESTIONNAIRE - PHQ9
CLINICAL INTERPRETATION OF PHQ2 SCORE: 2
SUM OF ALL RESPONSES TO PHQ QUESTIONS 1-9: 8
5. POOR APPETITE OR OVEREATING: 3 - NEARLY EVERY DAY

## 2018-08-01 NOTE — ASSESSMENT & PLAN NOTE
Has been emotional since her dog passed away, but does not want to go back on prozac. Denies SI/HI.

## 2018-08-01 NOTE — PROGRESS NOTES
Chief Complaint   Patient presents with   • Neurological Problem       HISTORY OF PRESENT ILLNESS: Patient is a 33 y.o. female established patient who presents today to discuss the following issues:    Depression  Has been emotional since her dog passed away, but does not want to go back on prozac. Denies SI/HI.    Type 1 diabetes mellitus without complication  Stress has been up and sugars have been elevated.  Will get back on track.    BMI 32.0-32.9,adult  Patient is aware of BMI elevation.  Brief discussion of diet, exercise, and lifestyle modification.      Diabetic neuropathy (HCC)  Her feet bother her all the time.  Admits to taking her dog's gabapentin and it worked well.  Discussed plan to titrate dose.      Patient Active Problem List    Diagnosis Date Noted   • Diabetic neuropathy ([facility]) 07/31/2018   • BMI 32.0-32.9,adult 10/05/2016   • Type 1 diabetes mellitus without complication ([facility]) 07/12/2016   • Depression 07/12/2016       Allergies:Percocet [oxycodone-acetaminophen] and Vicodin [hydrocodone-acetaminophen]    Current Outpatient Prescriptions   Medication Sig Dispense Refill   • gabapentin (NEURONTIN) 300 MG Cap Take 1 Cap by mouth 3 times a day as needed. 90 Cap 5   • NOVOLOG FLEXPEN 100 UNIT/ML Solution Pen-injector solution for injection INJECT 5 UNITS UNDER THE SKIN THREE TIMES DAILY, PER PRESCRIBERS INSTRUCTIONS 45 mL 0   • levothyroxine (SYNTHROID) 125 MCG Tab TAKE 1 TABLET BY MOUTH EVERY MORNING ON AN EMPTY STOMACH 90 Tab 0   • TRESIBA FLEXTOUCH 100 UNIT/ML Solution Pen-injector INJECT 40 UNITS UNDER THE SKIN AS INSTRUCTED EVERY NIGHT AT BEDTIME 15 PEN 3   • Blood Glucose Monitoring Suppl Supplies Misc Test strips order: Test strips for Abbott Freestyle Lite meter. Sig: use 3 times a day and prn ssx high or low sugar #300 RF x 3 300 Strip 3   • glucose blood strip 1 Strip by Other route as needed. 100 Strip 99   • buPROPion (WELLBUTRIN XL) 150 MG XL tablet Take 1 Tab by mouth every morning. 90  Tab 3   • pseudoephedrine SR (SUDAFED 12 HOUR) 120 MG TABLET SR 12 HR Take 1 Tab by mouth every morning. 20 Tab 0   • CYCLAFEM 1/35 1-35 MG-MCG per tablet Take 1 Tab by mouth every day.  11   • pantoprazole (PROTONIX) 40 MG Tablet Delayed Response Take 1 Tab by mouth every day.  11   • cetirizine (ZYRTEC) 10 MG TABS Take 10 mg by mouth every day.       No current facility-administered medications for this visit.        Social History   Substance Use Topics   • Smoking status: Never Smoker   • Smokeless tobacco: Never Used   • Alcohol use 0.6 oz/week     1 Glasses of wine per week       Family Status   Relation Status   • Mo Alive   • Fa Alive   • Unknown (Not Specified)   • Unknown (Not Specified)   • Unknown (Not Specified)     Family History   Problem Relation Age of Onset   • Hyperlipidemia Father    • Diabetes Unknown    • Hypertension Unknown    • Stroke Unknown        Review of Systems:   Constitutional: Negative for fever, chills, weight loss and malaise/fatigue.   HENT: Negative for ear pain, nosebleeds, congestion, sore throat and neck pain.    Eyes: Negative for blurred vision.   Respiratory: Negative for cough, sputum production, shortness of breath and wheezing.    Cardiovascular: Negative for chest pain, palpitations, orthopnea and leg swelling.   Gastrointestinal: Negative for heartburn, nausea, vomiting and abdominal pain.   Genitourinary: Negative for dysuria, urgency and frequency.   Musculoskeletal: Negative for myalgias, joint pain, and back pain.  Skin: Negative for rash and itching.   Neurological: Negative for dizziness, tingling, tremors, sensory change, focal weakness and headaches. Positive for neuropathy.  Endo/Heme/Allergies: Does not bruise/bleed easily.   Psychiatric/Behavioral: Negative for depression, suicidal ideas and memory loss.  The patient is not nervous/anxious and does not have insomnia.    All other systems reviewed and are negative except as in HPI.    Exam:  Blood pressure  "150/80, pulse 86, temperature 36.5 °C (97.7 °F), resp. rate 16, height 1.727 m (5' 8\"), weight 97.5 kg (215 lb), SpO2 97 %.  General:  Well nourished, well developed female in NAD  Head: Grossly normal.  Neck: Supple without JVD or bruit. Thyroid is not enlarged.  Pulmonary: Clear to ausculation. Normal effort. No rales, ronchi, or wheezing.  Cardiovascular: Regular rate and rhythm without murmur.   Abdomen:  Soft, nontender, nondistended.  Extremities: No clubbing, cyanosis, or edema.  Skin: Intact with no obvious rashes or lesions.  Neuro: Grossly intact.  Psych: Alert and oriented x 3.  Mood and affect appropriate.    Medical decision-making and discussion: Nita is here today for follow-up.  A prescription for gabapentin was sent to her pharmacy, and she will follow-up here as needed.          Assessment/Plan:  1. BMI 32.0-32.9,adult  Patient identified as having weight management issue.  Appropriate orders and counseling given.   2. Depression, unspecified depression type     3. Other diabetic neurological complication associated with type 1 diabetes mellitus (HCC)  gabapentin (NEURONTIN) 300 MG Cap   4. Type 1 diabetes mellitus without complication (HCC)         Return if symptoms worsen or fail to improve.    Please note that this dictation was created using voice recognition software. I have made every reasonable attempt to correct obvious errors, but I expect that there are errors of grammar and possibly content that I did not discover before finalizing the note.  "

## 2018-08-01 NOTE — ASSESSMENT & PLAN NOTE
Her feet bother her all the time.  Admits to taking her dog's gabapentin and it worked well.  Discussed plan to titrate dose.

## 2018-08-22 DIAGNOSIS — F32.A DEPRESSION, UNSPECIFIED DEPRESSION TYPE: ICD-10-CM

## 2018-08-23 RX ORDER — BUPROPION HYDROCHLORIDE 150 MG/1
TABLET ORAL
Qty: 90 TAB | Refills: 0 | Status: SHIPPED | OUTPATIENT
Start: 2018-08-23 | End: 2018-11-29 | Stop reason: SDUPTHER

## 2018-08-23 NOTE — TELEPHONE ENCOUNTER
Was the patient seen in the last year in this department? Yes    Does patient have an active prescription for medications requested? No     Received Request Via: Pharmacy    Pt met protocol?: Yes     Last OV 07/2018

## 2018-09-04 DIAGNOSIS — Z76.89 ENCOUNTER TO ESTABLISH CARE WITH NEW DOCTOR: ICD-10-CM

## 2018-09-05 DIAGNOSIS — E10.8 TYPE 1 DIABETES MELLITUS WITH COMPLICATION (HCC): ICD-10-CM

## 2018-09-05 RX ORDER — ALBUTEROL SULFATE 90 UG/1
2 AEROSOL, METERED RESPIRATORY (INHALATION) EVERY 4 HOURS PRN
Qty: 1 INHALER | Refills: 3 | Status: SHIPPED | OUTPATIENT
Start: 2018-09-05

## 2018-09-06 RX ORDER — LEVOTHYROXINE SODIUM 0.12 MG/1
TABLET ORAL
Qty: 90 TAB | Refills: 12 | Status: SHIPPED | OUTPATIENT
Start: 2018-09-06 | End: 2019-09-24 | Stop reason: SDUPTHER

## 2018-09-06 NOTE — TELEPHONE ENCOUNTER
Was the patient seen in the last year in this department? Yes    Does patient have an active prescription for medications requested? No     Received Request Via: Pharmacy      Pt met protocol?: Yes pt last ov 7/2018   TSH   Date Value Ref Range Status   06/22/2018 1.270 0.450 - 4.500 uIU/mL Final

## 2018-09-16 ENCOUNTER — OFFICE VISIT (OUTPATIENT)
Dept: URGENT CARE | Facility: PHYSICIAN GROUP | Age: 33
End: 2018-09-16
Payer: COMMERCIAL

## 2018-09-16 VITALS
WEIGHT: 216 LBS | BODY MASS INDEX: 32.74 KG/M2 | HEIGHT: 68 IN | SYSTOLIC BLOOD PRESSURE: 104 MMHG | RESPIRATION RATE: 16 BRPM | DIASTOLIC BLOOD PRESSURE: 58 MMHG | HEART RATE: 104 BPM | OXYGEN SATURATION: 96 % | TEMPERATURE: 97.6 F

## 2018-09-16 DIAGNOSIS — J01.40 ACUTE PANSINUSITIS, RECURRENCE NOT SPECIFIED: ICD-10-CM

## 2018-09-16 PROCEDURE — 99214 OFFICE O/P EST MOD 30 MIN: CPT | Performed by: NURSE PRACTITIONER

## 2018-09-16 RX ORDER — FLUTICASONE PROPIONATE 50 MCG
2 SPRAY, SUSPENSION (ML) NASAL DAILY
Qty: 1 BOTTLE | Refills: 0 | Status: SHIPPED | OUTPATIENT
Start: 2018-09-16 | End: 2019-06-17

## 2018-09-16 RX ORDER — AMOXICILLIN AND CLAVULANATE POTASSIUM 875; 125 MG/1; MG/1
1 TABLET, FILM COATED ORAL 2 TIMES DAILY
Qty: 14 TAB | Refills: 0 | Status: SHIPPED | OUTPATIENT
Start: 2018-09-16 | End: 2018-09-23

## 2018-09-16 ASSESSMENT — ENCOUNTER SYMPTOMS
COUGH: 1
ABDOMINAL PAIN: 0
CHILLS: 0
VOMITING: 0
SORE THROAT: 1
CONSTIPATION: 0
HEADACHES: 0
MYALGIAS: 0
SINUS PAIN: 1
EYE DISCHARGE: 0
SHORTNESS OF BREATH: 0
DIARRHEA: 0
WEAKNESS: 0
PALPITATIONS: 0
FEVER: 0
NAUSEA: 0
EYE REDNESS: 0
WHEEZING: 0
SPUTUM PRODUCTION: 0
DIZZINESS: 0
NECK PAIN: 0
ORTHOPNEA: 0

## 2018-09-16 NOTE — PROGRESS NOTES
Subjective:      Nita Jackman is a 33 y.o. female who presents with Sinus Problem (x 3 days )            HPI  C/o sore throat x 3 days, nasal congestion, sinus pressure, yellow-green nasal d/c, PND with intermittent cough. Advil cold and sinus x 3 days, Nyquil x1 dose. H/o recurrent chronic sinus problems and will take sinus meds.     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  MEDS:   Current Outpatient Prescriptions:   •  levothyroxine (SYNTHROID) 125 MCG Tab, TAKE 1 TABLET BY MOUTH EVERY MORNING ON AN EMPTY STOMACH, Disp: 90 Tab, Rfl: 12  •  buPROPion (WELLBUTRIN XL) 150 MG XL tablet, TAKE 1 TABLET BY MOUTH EVERY MORNING, Disp: 90 Tab, Rfl: 0  •  gabapentin (NEURONTIN) 300 MG Cap, Take 1 Cap by mouth 3 times a day as needed., Disp: 90 Cap, Rfl: 5  •  NOVOLOG FLEXPEN 100 UNIT/ML Solution Pen-injector solution for injection, INJECT 5 UNITS UNDER THE SKIN THREE TIMES DAILY, PER PRESCRIBERS INSTRUCTIONS, Disp: 45 mL, Rfl: 0  •  TRESIBA FLEXTOUCH 100 UNIT/ML Solution Pen-injector, INJECT 40 UNITS UNDER THE SKIN AS INSTRUCTED EVERY NIGHT AT BEDTIME, Disp: 15 PEN, Rfl: 3  •  CYCLAFEM 1/35 1-35 MG-MCG per tablet, Take 1 Tab by mouth every day., Disp: , Rfl: 11  •  pantoprazole (PROTONIX) 40 MG Tablet Delayed Response, Take 1 Tab by mouth every day., Disp: , Rfl: 11  •  cetirizine (ZYRTEC) 10 MG TABS, Take 10 mg by mouth every day., Disp: , Rfl:   •  albuterol 108 (90 Base) MCG/ACT Aero Soln inhalation aerosol, Inhale 2 Puffs by mouth every four hours as needed for Shortness of Breath., Disp: 1 Inhaler, Rfl: 3  •  Blood Glucose Monitoring Suppl Supplies Misc, Test strips order: Test strips for Abbott Freestyle Lite meter. Sig: use 3 times a day and prn ssx high or low sugar #300 RF x 3, Disp: 300 Strip, Rfl: 3  •  glucose blood strip, 1 Strip by Other route as needed., Disp: 100 Strip, Rfl: 99  •  pseudoephedrine SR (SUDAFED 12 HOUR) 120 MG  "TABLET SR 12 HR, Take 1 Tab by mouth every morning., Disp: 20 Tab, Rfl: 0  ALLERGIES:   Allergies   Allergen Reactions   • Percocet [Oxycodone-Acetaminophen] Itching     And nausea   • Vicodin [Hydrocodone-Acetaminophen]      SURGHX:   Past Surgical History:   Procedure Laterality Date   • CARPAL TUNNEL ENDOSCOPIC  5/24/2011    Performed by WAYLON COLEMAN at SURGERY [facility] ORS   • VITRECTOMY POSTERIOR  5/4/2009    Performed by DARYL DASILVA at SURGERY SAME DAY [facility] ORS   • EYE CRYOSURGERY  5/4/2009    Performed by DARYL DASILVA at SURGERY SAME DAY [facility] ORS   • RETINAL DETACHMENT REPAIR  7/11/2008    left/right eye     SOCHX:  reports that she has never smoked. She has never used smokeless tobacco. She reports that she drinks about 0.6 oz of alcohol per week . She reports that she does not use drugs.  FH: Family history was reviewed, no pertinent findings to report    Review of Systems   Constitutional: Positive for malaise/fatigue. Negative for chills and fever.   HENT: Positive for congestion, ear pain, sinus pain and sore throat.    Eyes: Negative for discharge and redness.   Respiratory: Positive for cough. Negative for sputum production, shortness of breath and wheezing.    Cardiovascular: Negative for chest pain, palpitations and orthopnea.   Gastrointestinal: Negative for abdominal pain, constipation, diarrhea, nausea and vomiting.   Musculoskeletal: Negative for myalgias and neck pain.   Skin: Negative for itching and rash.   Neurological: Negative for dizziness, weakness and headaches.   Endo/Heme/Allergies: Negative for environmental allergies.   All other systems reviewed and are negative.         Objective:     /58   Pulse (!) 104   Temp 36.4 °C (97.6 °F)   Resp 16   Ht 1.727 m (5' 8\")   Wt 98 kg (216 lb)   SpO2 96%   BMI 32.84 kg/m²      Physical Exam   Constitutional: She is oriented to person, place, and time. Vital signs are normal. She appears well-developed and " well-nourished. She is active and cooperative.  Non-toxic appearance. She does not have a sickly appearance. She appears ill. No distress.   HENT:   Head: Normocephalic.   Right Ear: External ear and ear canal normal. Tympanic membrane is injected and bulging.   Left Ear: External ear and ear canal normal. Tympanic membrane is injected and bulging.   Nose: Mucosal edema, rhinorrhea and sinus tenderness present. Right sinus exhibits maxillary sinus tenderness and frontal sinus tenderness. Left sinus exhibits maxillary sinus tenderness and frontal sinus tenderness.   Mouth/Throat: Uvula is midline. Mucous membranes are dry. No uvula swelling. Posterior oropharyngeal erythema present. No posterior oropharyngeal edema.   Eyes: Pupils are equal, round, and reactive to light. Conjunctivae and EOM are normal.   Neck: Normal range of motion. Neck supple.   Cardiovascular: Normal rate, regular rhythm and normal heart sounds.    Pulmonary/Chest: Effort normal and breath sounds normal. No accessory muscle usage. No respiratory distress. She has no decreased breath sounds. She has no wheezes. She has no rhonchi. She has no rales.   Musculoskeletal: Normal range of motion.   Lymphadenopathy:     She has no cervical adenopathy.   Neurological: She is alert and oriented to person, place, and time.   Skin: Skin is warm and dry. She is not diaphoretic.   Vitals reviewed.              Assessment/Plan:   1. Acute pansinusitis, recurrence not specified    - fluticasone (FLONASE) 50 MCG/ACT nasal spray; Spray 2 Sprays in nose every day.  Dispense: 1 Bottle; Refill: 0  - amoxicillin-clavulanate (AUGMENTIN) 875-125 MG Tab; Take 1 Tab by mouth 2 times a day for 7 days.  Dispense: 14 Tab; Refill: 0    D/c Advil cold and sinus  Increase water intake  Get rest  May use Ibuprofen/Tylenol prn for any fever, body aches or throat pain  May take longer acting antihistamine for seasonal allergy symptoms prn  May use saline nasal spray for nasal  congestion  May use Flonase for allergen nasal congestion  May gargle with salt water prn for throat discomfort  May drink smoothies for nutrition if too painful to swallow solid foods  Monitor for productive cough, SOB and chest pain/tightness- need re-evaluation

## 2018-10-03 DIAGNOSIS — E10.49 OTHER DIABETIC NEUROLOGICAL COMPLICATION ASSOCIATED WITH TYPE 1 DIABETES MELLITUS (HCC): ICD-10-CM

## 2018-10-19 RX ORDER — INSULIN ASPART 100 [IU]/ML
INJECTION, SOLUTION INTRAVENOUS; SUBCUTANEOUS
Qty: 15 ML | Refills: 1 | Status: SHIPPED | OUTPATIENT
Start: 2018-10-19 | End: 2019-01-30 | Stop reason: SDUPTHER

## 2018-10-19 NOTE — TELEPHONE ENCOUNTER
Patient has recently been seen by PCP within the last 6 months per protocol (7/18). Will refill medications for 6 months.  Lab Results   Component Value Date/Time    HBA1C 10.0 (H) 06/22/2018 09:13 AM    HBA1C 12.2 07/12/2016 10:15 AM      Lab Results   Component Value Date/Time    MICRALB 7.1 06/22/2018 09:13 AM      Lab Results   Component Value Date/Time    ALKPHOSPHAT 93 06/22/2018 09:13 AM    ALKPHOSPHAT 69 10/29/2009 01:00 PM    ASTSGOT 13 06/22/2018 09:13 AM    ASTSGOT 11 (L) 10/29/2009 01:00 PM    ALTSGPT 12 06/22/2018 09:13 AM    ALTSGPT 9 10/29/2009 01:00 PM    TBILIRUBIN 0.3 06/22/2018 09:13 AM    TBILIRUBIN 0.5 10/29/2009 01:00 PM

## 2018-11-29 DIAGNOSIS — F32.A DEPRESSION, UNSPECIFIED DEPRESSION TYPE: ICD-10-CM

## 2018-11-30 RX ORDER — INSULIN DEGLUDEC 100 U/ML
INJECTION, SOLUTION SUBCUTANEOUS
Refills: 0 | OUTPATIENT
Start: 2018-11-30

## 2018-11-30 RX ORDER — INSULIN DEGLUDEC 100 U/ML
INJECTION, SOLUTION SUBCUTANEOUS
Qty: 20 PEN | Refills: 0 | Status: SHIPPED | OUTPATIENT
Start: 2018-11-30 | End: 2019-03-19 | Stop reason: SDUPTHER

## 2018-11-30 RX ORDER — BUPROPION HYDROCHLORIDE 150 MG/1
TABLET ORAL
Qty: 90 TAB | Refills: 0 | Status: SHIPPED | OUTPATIENT
Start: 2018-11-30 | End: 2019-06-17

## 2018-12-24 RX ORDER — INSULIN ASPART 100 [IU]/ML
INJECTION, SOLUTION INTRAVENOUS; SUBCUTANEOUS
Qty: 15 ML | Refills: 0 | OUTPATIENT
Start: 2018-12-24

## 2019-01-31 RX ORDER — INSULIN ASPART 100 [IU]/ML
INJECTION, SOLUTION INTRAVENOUS; SUBCUTANEOUS
Qty: 15 ML | Refills: 0 | Status: SHIPPED | OUTPATIENT
Start: 2019-01-31 | End: 2019-03-11 | Stop reason: SDUPTHER

## 2019-01-31 NOTE — TELEPHONE ENCOUNTER
Was the patient seen in the last year in this department? Yes    Does patient have an active prescription for medications requested? No     Received Request Via: Pharmacy      Pt met protocol?: Yes, OV 7/18   Lab Results   Component Value Date/Time    HBA1C 10.0 (H) 06/22/2018 09:13 AM    HBA1C 12.2 07/12/2016 10:15 AM

## 2019-03-19 DIAGNOSIS — E10.9 TYPE 1 DIABETES MELLITUS WITHOUT COMPLICATION (HCC): ICD-10-CM

## 2019-03-19 RX ORDER — LANCETS 30 GAUGE
EACH MISCELLANEOUS
Qty: 300 EACH | Refills: 3 | Status: SHIPPED | OUTPATIENT
Start: 2019-03-19 | End: 2020-07-29

## 2019-04-03 ENCOUNTER — OFFICE VISIT (OUTPATIENT)
Dept: URGENT CARE | Facility: PHYSICIAN GROUP | Age: 34
End: 2019-04-03
Payer: COMMERCIAL

## 2019-04-03 ENCOUNTER — HOSPITAL ENCOUNTER (OUTPATIENT)
Dept: RADIOLOGY | Facility: MEDICAL CENTER | Age: 34
End: 2019-04-03
Attending: NURSE PRACTITIONER
Payer: COMMERCIAL

## 2019-04-03 VITALS
HEART RATE: 104 BPM | OXYGEN SATURATION: 98 % | DIASTOLIC BLOOD PRESSURE: 76 MMHG | RESPIRATION RATE: 16 BRPM | SYSTOLIC BLOOD PRESSURE: 126 MMHG | BODY MASS INDEX: 32.13 KG/M2 | WEIGHT: 212 LBS | HEIGHT: 68 IN | TEMPERATURE: 98.1 F

## 2019-04-03 DIAGNOSIS — S86.911A KNEE STRAIN, RIGHT, INITIAL ENCOUNTER: ICD-10-CM

## 2019-04-03 DIAGNOSIS — M25.461 SWELLING OF JOINT OF RIGHT KNEE: ICD-10-CM

## 2019-04-03 PROCEDURE — 99214 OFFICE O/P EST MOD 30 MIN: CPT | Performed by: NURSE PRACTITIONER

## 2019-04-03 PROCEDURE — 73564 X-RAY EXAM KNEE 4 OR MORE: CPT | Mod: RT

## 2019-04-03 ASSESSMENT — ENCOUNTER SYMPTOMS
BRUISES/BLEEDS EASILY: 0
CHILLS: 0
FEVER: 0
WEAKNESS: 0
TINGLING: 0
FALLS: 0
SENSORY CHANGE: 0
MYALGIAS: 1

## 2019-04-04 NOTE — PROGRESS NOTES
"Subjective:      Nita Jackman is a 33 y.o. female who presents with Knee Pain (x 2 weeks)            HPI  Two weeks ago had swelling without redness or fever. H/o diabetes. States has \"fluid inside my right knee\". States \"achy\" above knee cap. Previous injury right knee, hyperextended 18 yrs ago. Will \"catch\" with cold weather changes. Denies recent trauma, fall. Works as canine rehab tech, admits to working on knees, will use 2\" knee pad, admits to intermittent use x 2 weeks. No ace wrap or knee sleeve used. Takes gabapentin for neuropathy due to type 1 diabetes. Aleve prn. Denies new numbness/tingling, but gets this in the ball of foot.    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  MEDS:   Current Outpatient Prescriptions:   •  NOVOLOG, insulin aspart, (NOVOLOG FLEXPEN) 100 UNIT/ML Solution Pen-injector injection, Inject 5-15 Units as instructed 3 times a day before meals., Disp: 15 mL, Rfl: 99  •  Insulin Degludec (TRESIBA FLEXTOUCH) 100 UNIT/ML Solution Pen-injector, Inject 40 Units as instructed every bedtime., Disp: 20 PEN, Rfl: 99  •  Blood Glucose Meter Kit, Test blood sugar as recommended by provider., Disp: 1 Kit, Rfl: 0  •  Blood Glucose Test Strips, Use one strip to test blood sugar three times daily., Disp: 300 Strip, Rfl: 3  •  Lancets, Use one lancet to test blood sugar three times daily., Disp: 300 Each, Rfl: 3  •  buPROPion (WELLBUTRIN XL) 150 MG XL tablet, TAKE 1 TABLET BY MOUTH EVERY MORNING, Disp: 90 Tab, Rfl: 0  •  fluticasone (FLONASE) 50 MCG/ACT nasal spray, Spray 2 Sprays in nose every day., Disp: 1 Bottle, Rfl: 0  •  levothyroxine (SYNTHROID) 125 MCG Tab, TAKE 1 TABLET BY MOUTH EVERY MORNING ON AN EMPTY STOMACH, Disp: 90 Tab, Rfl: 12  •  albuterol 108 (90 Base) MCG/ACT Aero Soln inhalation aerosol, Inhale 2 Puffs by mouth every four hours as needed for Shortness of Breath., Disp: 1 Inhaler, Rfl: 3  •  Blood " Glucose Monitoring Suppl Supplies Misc, Test strips order: Test strips for Abbott Freestyle Lite meter. Sig: use 3 times a day and prn ssx high or low sugar #300 RF x 3, Disp: 300 Strip, Rfl: 3  •  gabapentin (NEURONTIN) 300 MG Cap, Take 1 Cap by mouth 3 times a day as needed., Disp: 90 Cap, Rfl: 5  •  glucose blood strip, 1 Strip by Other route as needed., Disp: 100 Strip, Rfl: 99  •  pseudoephedrine SR (SUDAFED 12 HOUR) 120 MG TABLET SR 12 HR, Take 1 Tab by mouth every morning., Disp: 20 Tab, Rfl: 0  •  CYCLAFEM 1/35 1-35 MG-MCG per tablet, Take 1 Tab by mouth every day., Disp: , Rfl: 11  •  pantoprazole (PROTONIX) 40 MG Tablet Delayed Response, Take 1 Tab by mouth every day., Disp: , Rfl: 11  •  cetirizine (ZYRTEC) 10 MG TABS, Take 10 mg by mouth every day., Disp: , Rfl:   ALLERGIES:   Allergies   Allergen Reactions   • Percocet [Oxycodone-Acetaminophen] Itching     And nausea   • Vicodin [Hydrocodone-Acetaminophen]      SURGHX:   Past Surgical History:   Procedure Laterality Date   • CARPAL TUNNEL ENDOSCOPIC  5/24/2011    Performed by WAYLON COLEMAN at SURGERY [facility] ORS   • VITRECTOMY POSTERIOR  5/4/2009    Performed by DARYL DASILVA at SURGERY SAME DAY [facility] ORS   • EYE CRYOSURGERY  5/4/2009    Performed by DARYL DASILVA at SURGERY SAME DAY [facility] ORS   • RETINAL DETACHMENT REPAIR  7/11/2008    left/right eye     SOCHX:  reports that she has never smoked. She has never used smokeless tobacco. She reports that she drinks about 0.6 oz of alcohol per week . She reports that she does not use drugs.  FH: Family history was reviewed, no pertinent findings to report    Review of Systems   Constitutional: Negative for chills, fever and malaise/fatigue.   Musculoskeletal: Positive for joint pain and myalgias. Negative for falls.   Skin: Negative for itching and rash.   Neurological: Negative for tingling, sensory change and weakness.   Endo/Heme/Allergies: Does not bruise/bleed easily.   All other  "systems reviewed and are negative.         Objective:     /76   Pulse (!) 104   Temp 36.7 °C (98.1 °F)   Resp 16   Ht 1.727 m (5' 8\")   Wt 96.2 kg (212 lb)   SpO2 98%   BMI 32.23 kg/m²      Physical Exam   Constitutional: She is oriented to person, place, and time. Vital signs are normal. She appears well-developed and well-nourished. She is active and cooperative.  Non-toxic appearance. She does not have a sickly appearance. She does not appear ill. No distress.   HENT:   Head: Normocephalic.   Eyes: Pupils are equal, round, and reactive to light. EOM are normal.   Cardiovascular: Normal rate.    Pulmonary/Chest: Effort normal.   Musculoskeletal: She exhibits edema and tenderness. She exhibits no deformity.        Right knee: She exhibits swelling. She exhibits normal range of motion, no effusion, no ecchymosis, no deformity, no laceration, no erythema, normal alignment, no LCL laxity, normal patellar mobility, no bony tenderness, normal meniscus and no MCL laxity. Tenderness found. Patellar tendon tenderness noted.        Legs:  Swelling without redness or heat to right knee joint space. Mild TTP above patella at distal femoral patellar junction. No antalgic gait.   Neurological: She is alert and oriented to person, place, and time.   Skin: Skin is warm and dry. No rash noted. She is not diaphoretic. No erythema.   Vitals reviewed.            Knee xray FINDINGS:  No acute fracture or dislocation.  No joint osteoarthritis  No knee joint effusion.    MA applied knee velcro splint  Assessment/Plan:     1. Swelling of joint of right knee    - DX-KNEE COMPLETE 4+ RIGHT; Future  - REFERRAL TO SPORTS MEDICINE    2. Knee strain, right, initial encounter    - REFERRAL TO SPORTS MEDICINE    May use NSAID prn for pain/swelling  May use cool compresses for swelling prn  May utilize RICE method prn  Avoid excessive weight bearing to avoid further injury  May apply topical analgesics prn  Perform proper body " mechanics with lifting, twisting, bending and walking  Monitor for deformity, numbness/tingling in toes, decreased ROM with weight bearing- need re-evaluation  F/o Sports Med

## 2019-04-09 ENCOUNTER — TELEPHONE (OUTPATIENT)
Dept: MEDICAL GROUP | Facility: PHYSICIAN GROUP | Age: 34
End: 2019-04-09

## 2019-04-09 DIAGNOSIS — E10.9 TYPE 1 DIABETES MELLITUS WITHOUT COMPLICATION (HCC): ICD-10-CM

## 2019-04-19 ENCOUNTER — TELEPHONE (OUTPATIENT)
Dept: MEDICAL GROUP | Facility: PHYSICIAN GROUP | Age: 34
End: 2019-04-19

## 2019-04-19 DIAGNOSIS — E10.8 TYPE 1 DIABETES MELLITUS WITH COMPLICATION (HCC): ICD-10-CM

## 2019-04-19 NOTE — TELEPHONE ENCOUNTER
----- Message from Nita Jackman sent at 4/19/2019  7:45 AM PDT -----  Regarding: Non-Urgent Medical Question  Contact: [phone]  Gerardo Briceno  I left a voicemail for your medical assistant but haven't heard back yet. I was wondering if I could get a referral to see Jhonny Schultz for my diabetes? I have some friends who are seeing him who have had great results as far as lowering A1c. I would appreciate it!    Thanks, Nita

## 2019-04-19 NOTE — TELEPHONE ENCOUNTER
SPECIFIC Action To Be Taken: Referral Needed     2. Diagnosis/Clinical Reason for Request: Diabetes     3. Has an appt been made? No    4. Specialty & Provider Name/Lab/Imaging Location: [facility]     5. Patient approves a detailed message N\A    6. Patient preferred communication method: MyChart Voicemail Letter    7. Patient informed they will receive a return phone call from Primary Care office ONLY if there are any questions before processing their request and to call back if they haven't received a call in 5 days.

## 2019-04-22 ENCOUNTER — OFFICE VISIT (OUTPATIENT)
Dept: MEDICAL GROUP | Facility: CLINIC | Age: 34
End: 2019-04-22
Payer: COMMERCIAL

## 2019-04-22 ENCOUNTER — APPOINTMENT (OUTPATIENT)
Dept: RADIOLOGY | Facility: IMAGING CENTER | Age: 34
End: 2019-04-22
Attending: FAMILY MEDICINE
Payer: COMMERCIAL

## 2019-04-22 VITALS
SYSTOLIC BLOOD PRESSURE: 130 MMHG | OXYGEN SATURATION: 96 % | HEART RATE: 99 BPM | WEIGHT: 212 LBS | RESPIRATION RATE: 18 BRPM | TEMPERATURE: 98.8 F | DIASTOLIC BLOOD PRESSURE: 86 MMHG | BODY MASS INDEX: 32.13 KG/M2 | HEIGHT: 68 IN

## 2019-04-22 DIAGNOSIS — M25.551 HIP PAIN, RIGHT: ICD-10-CM

## 2019-04-22 DIAGNOSIS — M22.2X1 PATELLOFEMORAL DISORDER, RIGHT: ICD-10-CM

## 2019-04-22 DIAGNOSIS — M70.41 PREPATELLAR BURSITIS OF RIGHT KNEE: ICD-10-CM

## 2019-04-22 PROCEDURE — 99203 OFFICE O/P NEW LOW 30 MIN: CPT | Performed by: FAMILY MEDICINE

## 2019-04-22 PROCEDURE — 73502 X-RAY EXAM HIP UNI 2-3 VIEWS: CPT | Mod: TC,RT | Performed by: FAMILY MEDICINE

## 2019-04-22 NOTE — PROGRESS NOTES
CHIEF COMPLAINT:  Nita Jackman female presenting at the request of PAN Prajapati for evaluation of knee pain.     Nita Jackman is complaining of right knee pain and swelling  Insidious onset without injury, symptoms began back in February 2019  Recurrence of swelling at the end of March 2019  Pain is at the anterior knee  Quality is aching, pressure  Pain is non-radiating   Improved with resting  Aggravated by standing, walking, kneeling  previous knee injury, hyperextension of the RIGHT knee back in 2001 with bone contusion and question of meniscal tear, treated nonoperatively   Prior Treatments: seen by PCP  Prior studies: X-Ray   Medications tried for pain include: ibuprofen (OTC) without improvement  She has also FAILED icing and heat  Mechanical Symptom history: No Locking, Grinding and Sensation the knee giving out when going up stairs with the RIGHT knee     Also history of RIGHT hip pain for the past year or so    REVIEW OF SYSTEMS  No Nausea, No Vomiting, No Chest Pain, No Shortness of Breath, No Dizziness, No Headache      PAST MEDICAL HISTORY:   History reviewed. No pertinent past medical history.    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  MEDS:   Current Outpatient Prescriptions:   •  Blood Glucose Test Strips, Test strips order: Test strips for insurance approved meter. Sig: use 4x per day and prn ssx high or low sugar, Disp: 360 Each, Rfl: 11  •  NOVOLOG, insulin aspart, (NOVOLOG FLEXPEN) 100 UNIT/ML Solution Pen-injector injection, Inject 5-15 Units as instructed 3 times a day before meals., Disp: 15 mL, Rfl: 99  •  Insulin Degludec (TRESIBA FLEXTOUCH) 100 UNIT/ML Solution Pen-injector, Inject 40 Units as instructed every bedtime., Disp: 20 PEN, Rfl: 99  •  Blood Glucose Meter Kit, Test blood sugar as recommended by provider., Disp: 1 Kit, Rfl: 0  •  Blood Glucose Test Strips, Use one strip to test blood  sugar three times daily., Disp: 300 Strip, Rfl: 3  •  Lancets, Use one lancet to test blood sugar three times daily., Disp: 300 Each, Rfl: 3  •  buPROPion (WELLBUTRIN XL) 150 MG XL tablet, TAKE 1 TABLET BY MOUTH EVERY MORNING, Disp: 90 Tab, Rfl: 0  •  fluticasone (FLONASE) 50 MCG/ACT nasal spray, Spray 2 Sprays in nose every day., Disp: 1 Bottle, Rfl: 0  •  levothyroxine (SYNTHROID) 125 MCG Tab, TAKE 1 TABLET BY MOUTH EVERY MORNING ON AN EMPTY STOMACH, Disp: 90 Tab, Rfl: 12  •  albuterol 108 (90 Base) MCG/ACT Aero Soln inhalation aerosol, Inhale 2 Puffs by mouth every four hours as needed for Shortness of Breath., Disp: 1 Inhaler, Rfl: 3  •  Blood Glucose Monitoring Suppl Supplies Misc, Test strips order: Test strips for Abbott Freestyle Lite meter. Sig: use 3 times a day and prn ssx high or low sugar #300 RF x 3, Disp: 300 Strip, Rfl: 3  •  gabapentin (NEURONTIN) 300 MG Cap, Take 1 Cap by mouth 3 times a day as needed., Disp: 90 Cap, Rfl: 5  •  glucose blood strip, 1 Strip by Other route as needed., Disp: 100 Strip, Rfl: 99  •  pseudoephedrine SR (SUDAFED 12 HOUR) 120 MG TABLET SR 12 HR, Take 1 Tab by mouth every morning., Disp: 20 Tab, Rfl: 0  •  CYCLAFEM 1/35 1-35 MG-MCG per tablet, Take 1 Tab by mouth every day., Disp: , Rfl: 11  •  pantoprazole (PROTONIX) 40 MG Tablet Delayed Response, Take 1 Tab by mouth every day., Disp: , Rfl: 11  •  cetirizine (ZYRTEC) 10 MG TABS, Take 10 mg by mouth every day., Disp: , Rfl:   ALLERGIES:   Allergies   Allergen Reactions   • Percocet [Oxycodone-Acetaminophen] Itching     And nausea   • Vicodin [Hydrocodone-Acetaminophen]      SURGHX:   Past Surgical History:   Procedure Laterality Date   • CARPAL TUNNEL ENDOSCOPIC  5/24/2011    Performed by WAYLON COLEMAN at SURGERY [facility] ORS   • VITRECTOMY POSTERIOR  5/4/2009    Performed by DARYL DASILVA at SURGERY SAME DAY [facility] ORS   • EYE CRYOSURGERY  5/4/2009    Performed by DARYL DASILVA at SURGERY SAME DAY  [facility] ORS   • RETINAL DETACHMENT REPAIR  7/11/2008    left/right eye     SOCHX:  reports that she has never smoked. She has never used smokeless tobacco. She reports that she drinks about 0.6 oz of alcohol per week . She reports that she does not use drugs.  FH: Family history was reviewed, no pertinent findings to report     PHYSICAL EXAM:  /86 (BP Location: Left arm, Patient Position: Sitting, BP Cuff Size: Adult)   Pulse 99   Temp 37.1 °C (98.8 °F) (Temporal)   Resp 18   Ht 1.727 m (5' 8\")   Wt 96.2 kg (212 lb)   SpO2 96%   BMI 32.23 kg/m²      slightly overweight in no apparent distress, alert and oriented x 3.  Gait: antalgic     RIGHT Knee:  Slight Varus and Swelling At the prepatellar bursa on the RIGHT  Range of Motion Intact  Trace effusion  Patellar No tenderness and no apprehension  Medial Joint Line Non-tender and NEGATIVE Yasir  Lateral Joint Line Non-tender and NEGATIVE Yasir  Trace Laxity with Varus stress  Trace Laxity with Valgus stress  Lachman's testing is Trace  Posterior Drawer Testing is Trace  The leg is otherwise neurovascularly intact    LEFT Knee:  Slight Varus and No Swelling   Range of Motion Intact  Trace effusion  Patellar No tenderness and no apprehension  Medial Joint Line Non-tender and NEGATIVE Yasir  Lateral Joint Line Non-tender and NEGATIVE Yasir  Trace Laxity with Varus stress  Trace Laxity with Valgus stress  Lachman's testing is Trace  Posterior Drawer Testing is Trace  The leg is otherwise neurovascularly intact    POSITIVE bilateral hip pain, worse on the RIGHT compared to left with internal rotation    Additional Findings: Tight hamstrings    1. Patellofemoral disorder, right  REFERRAL TO PHYSICAL THERAPY Reason for Therapy: Eval/Treat/Report   2. Prepatellar bursitis of right knee  REFERRAL TO PHYSICAL THERAPY Reason for Therapy: Eval/Treat/Report   3. Hip pain, right  DX-HIP-COMPLETE - UNILATERAL 2+ RIGHT    CANCELED: DX-PELVIS-1 OR 2 VIEWS     "     PT and HEP for knee  Consider R knee injection if she is not tolerating PT    Return in about 6 weeks (around 6/3/2019).         4/22/2019 2:21 PM    HISTORY/REASON FOR EXAM:  Right hip pain for one year.      TECHNIQUE/EXAM DESCRIPTION AND NUMBER OF VIEWS:  3 views of the RIGHT hip.    COMPARISON: None    FINDINGS:  There is no evidence of acute fracture involving the pelvis. No proximal femoral fracture is identified.  There is no evidence of femoral head flattening. No femoral head deformity is identified. No bone erosions are appreciated.     Impression       1.  No radiographic evidence of acute traumatic injury.             4/3/2019 6:54 PM    HISTORY/REASON FOR EXAM:  no trauma, recurring swelling in knee joint; Atraumatic Pain/Swelling/Deformity  Right knee pain and swelling x 2 weeks    TECHNIQUE/EXAM DESCRIPTION AND NUMBER OF VIEWS:  4 views of the RIGHT knee.    COMPARISON: None    FINDINGS:  No acute fracture or dislocation.    No joint osteoarthritis    No knee joint effusion.   Impression         1. No acute osseous abnormality.     taken here and reviewed by me    Thank you BRANDON Prajapati. for allowing me to participate in caring for your patient.

## 2019-05-14 ENCOUNTER — HOSPITAL ENCOUNTER (OUTPATIENT)
Facility: MEDICAL CENTER | Age: 34
End: 2019-05-14
Attending: NURSE PRACTITIONER
Payer: COMMERCIAL

## 2019-05-14 ENCOUNTER — OFFICE VISIT (OUTPATIENT)
Dept: URGENT CARE | Facility: PHYSICIAN GROUP | Age: 34
End: 2019-05-14
Payer: COMMERCIAL

## 2019-05-14 VITALS
TEMPERATURE: 97.4 F | DIASTOLIC BLOOD PRESSURE: 82 MMHG | HEIGHT: 68 IN | SYSTOLIC BLOOD PRESSURE: 130 MMHG | BODY MASS INDEX: 31.83 KG/M2 | OXYGEN SATURATION: 98 % | RESPIRATION RATE: 16 BRPM | HEART RATE: 88 BPM | WEIGHT: 210 LBS

## 2019-05-14 DIAGNOSIS — R30.0 DYSURIA: ICD-10-CM

## 2019-05-14 DIAGNOSIS — B37.9 ANTIBIOTIC-INDUCED YEAST INFECTION: ICD-10-CM

## 2019-05-14 DIAGNOSIS — T36.95XA ANTIBIOTIC-INDUCED YEAST INFECTION: ICD-10-CM

## 2019-05-14 DIAGNOSIS — N30.00 ACUTE CYSTITIS WITHOUT HEMATURIA: ICD-10-CM

## 2019-05-14 LAB
APPEARANCE UR: NORMAL
BILIRUB UR STRIP-MCNC: NEGATIVE MG/DL
COLOR UR AUTO: NORMAL
GLUCOSE UR STRIP.AUTO-MCNC: 1000 MG/DL
INT CON NEG: NEGATIVE
INT CON POS: POSITIVE
KETONES UR STRIP.AUTO-MCNC: NORMAL MG/DL
LEUKOCYTE ESTERASE UR QL STRIP.AUTO: NEGATIVE
NITRITE UR QL STRIP.AUTO: POSITIVE
PH UR STRIP.AUTO: 5.5 [PH] (ref 5–8)
POC URINE PREGNANCY TEST: NEGATIVE
PROT UR QL STRIP: 30 MG/DL
RBC UR QL AUTO: NEGATIVE
SP GR UR STRIP.AUTO: 1.02
UROBILINOGEN UR STRIP-MCNC: 1 MG/DL

## 2019-05-14 PROCEDURE — 99214 OFFICE O/P EST MOD 30 MIN: CPT | Performed by: NURSE PRACTITIONER

## 2019-05-14 PROCEDURE — 87077 CULTURE AEROBIC IDENTIFY: CPT

## 2019-05-14 PROCEDURE — 81025 URINE PREGNANCY TEST: CPT | Performed by: NURSE PRACTITIONER

## 2019-05-14 PROCEDURE — 87086 URINE CULTURE/COLONY COUNT: CPT

## 2019-05-14 PROCEDURE — 87186 SC STD MICRODIL/AGAR DIL: CPT

## 2019-05-14 PROCEDURE — 81002 URINALYSIS NONAUTO W/O SCOPE: CPT | Performed by: NURSE PRACTITIONER

## 2019-05-14 RX ORDER — FLUCONAZOLE 150 MG/1
150 TABLET ORAL ONCE
Qty: 1 TAB | Refills: 0 | Status: SHIPPED | OUTPATIENT
Start: 2019-05-14 | End: 2019-05-14

## 2019-05-14 RX ORDER — CEFUROXIME AXETIL 250 MG/1
250 TABLET ORAL 2 TIMES DAILY
Qty: 14 TAB | Refills: 0 | Status: SHIPPED | OUTPATIENT
Start: 2019-05-14 | End: 2019-05-21

## 2019-05-15 ENCOUNTER — HOSPITAL ENCOUNTER (OUTPATIENT)
Facility: MEDICAL CENTER | Age: 34
End: 2019-05-15
Attending: NURSE PRACTITIONER
Payer: COMMERCIAL

## 2019-05-15 DIAGNOSIS — N30.00 ACUTE CYSTITIS WITHOUT HEMATURIA: ICD-10-CM

## 2019-05-15 ASSESSMENT — ENCOUNTER SYMPTOMS
ABDOMINAL PAIN: 0
MYALGIAS: 1
CONSTIPATION: 0
DIARRHEA: 0
DIZZINESS: 0
FLANK PAIN: 0
NAUSEA: 1
BACK PAIN: 1
CHILLS: 0
WEAKNESS: 0
HEADACHES: 0
FEVER: 0
VOMITING: 0

## 2019-05-15 NOTE — PROGRESS NOTES
"Subjective:      Nita Jackman is a 34 y.o. female who presents with Painful Urination (x1 day, burning sensation, low abdominal cramping, slight back pain)            HPI  Dysuria, urgency, frequency x 1 day. Started pyridium today. Last UTI \"years ago\". States will get UTIS from stress and has increased lately. Denies fevers, but has mild nausea, no vomiting, mild back pain, mild low pelvic pressure. Denies vaginal d/c, mild vaginal itchiness. Oral birth control. LMP 3/2019. H/o irregular periods.    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  MEDS:   Current Outpatient Prescriptions:   •  Blood Glucose Test Strips, Test strips order: Test strips for insurance approved meter. Sig: use 4x per day and prn ssx high or low sugar, Disp: 360 Each, Rfl: 11  •  NOVOLOG, insulin aspart, (NOVOLOG FLEXPEN) 100 UNIT/ML Solution Pen-injector injection, Inject 5-15 Units as instructed 3 times a day before meals., Disp: 15 mL, Rfl: 99  •  Insulin Degludec (TRESIBA FLEXTOUCH) 100 UNIT/ML Solution Pen-injector, Inject 40 Units as instructed every bedtime., Disp: 20 PEN, Rfl: 99  •  Blood Glucose Meter Kit, Test blood sugar as recommended by provider., Disp: 1 Kit, Rfl: 0  •  Blood Glucose Test Strips, Use one strip to test blood sugar three times daily., Disp: 300 Strip, Rfl: 3  •  Lancets, Use one lancet to test blood sugar three times daily., Disp: 300 Each, Rfl: 3  •  buPROPion (WELLBUTRIN XL) 150 MG XL tablet, TAKE 1 TABLET BY MOUTH EVERY MORNING, Disp: 90 Tab, Rfl: 0  •  fluticasone (FLONASE) 50 MCG/ACT nasal spray, Spray 2 Sprays in nose every day., Disp: 1 Bottle, Rfl: 0  •  levothyroxine (SYNTHROID) 125 MCG Tab, TAKE 1 TABLET BY MOUTH EVERY MORNING ON AN EMPTY STOMACH, Disp: 90 Tab, Rfl: 12  •  albuterol 108 (90 Base) MCG/ACT Aero Soln inhalation aerosol, Inhale 2 Puffs by mouth every four hours as needed for Shortness of Breath., Disp: 1 " Inhaler, Rfl: 3  •  Blood Glucose Monitoring Suppl Supplies Misc, Test strips order: Test strips for Abbott Freestyle Lite meter. Sig: use 3 times a day and prn ssx high or low sugar #300 RF x 3, Disp: 300 Strip, Rfl: 3  •  gabapentin (NEURONTIN) 300 MG Cap, Take 1 Cap by mouth 3 times a day as needed., Disp: 90 Cap, Rfl: 5  •  glucose blood strip, 1 Strip by Other route as needed., Disp: 100 Strip, Rfl: 99  •  pseudoephedrine SR (SUDAFED 12 HOUR) 120 MG TABLET SR 12 HR, Take 1 Tab by mouth every morning., Disp: 20 Tab, Rfl: 0  •  CYCLAFEM 1/35 1-35 MG-MCG per tablet, Take 1 Tab by mouth every day., Disp: , Rfl: 11  •  pantoprazole (PROTONIX) 40 MG Tablet Delayed Response, Take 1 Tab by mouth every day., Disp: , Rfl: 11  •  cetirizine (ZYRTEC) 10 MG TABS, Take 10 mg by mouth every day., Disp: , Rfl:   ALLERGIES:   Allergies   Allergen Reactions   • Percocet [Oxycodone-Acetaminophen] Itching     And nausea   • Vicodin [Hydrocodone-Acetaminophen]      SURGHX:   Past Surgical History:   Procedure Laterality Date   • CARPAL TUNNEL ENDOSCOPIC  5/24/2011    Performed by WAYLON COLEMAN at SURGERY [facility] ORS   • VITRECTOMY POSTERIOR  5/4/2009    Performed by DARYL DASILVA at SURGERY SAME DAY [facility] ORS   • EYE CRYOSURGERY  5/4/2009    Performed by DARYL DASILVA at SURGERY SAME DAY [facility] ORS   • RETINAL DETACHMENT REPAIR  7/11/2008    left/right eye     SOCHX:  reports that she has never smoked. She has never used smokeless tobacco. She reports that she drinks about 0.6 oz of alcohol per week . She reports that she does not use drugs.  FH: Family history was reviewed, no pertinent findings to report    Review of Systems   Constitutional: Negative for chills, fever and malaise/fatigue.   Gastrointestinal: Positive for nausea. Negative for abdominal pain, constipation, diarrhea and vomiting.   Genitourinary: Positive for dysuria, frequency and urgency. Negative for flank pain and hematuria.  "  Musculoskeletal: Positive for back pain and myalgias.   Skin: Negative for itching and rash.   Neurological: Negative for dizziness, weakness and headaches.   All other systems reviewed and are negative.         Objective:     /82 (BP Location: Right arm, Patient Position: Sitting, BP Cuff Size: Adult)   Pulse 88   Temp 36.3 °C (97.4 °F) (Temporal)   Resp 16   Ht 1.727 m (5' 8\")   Wt 95.3 kg (210 lb)   SpO2 98%   BMI 31.93 kg/m²      Physical Exam   Constitutional: She is oriented to person, place, and time. Vital signs are normal. She appears well-developed and well-nourished. She is active and cooperative.  Non-toxic appearance. She does not have a sickly appearance. She does not appear ill. No distress.   HENT:   Head: Normocephalic.   Eyes: Pupils are equal, round, and reactive to light. Conjunctivae and EOM are normal.   Neck: Normal range of motion. Neck supple.   Cardiovascular: Normal rate.    Pulmonary/Chest: Effort normal. No accessory muscle usage. No respiratory distress.   Abdominal: Soft. Bowel sounds are normal. She exhibits no distension. There is no hepatosplenomegaly. There is tenderness in the suprapubic area. There is no rigidity, no rebound, no guarding, no CVA tenderness, no tenderness at McBurney's point and negative Dickson's sign. No hernia.       Mild discomfort on palpation of low pelvic region/bladder region.   Musculoskeletal: Normal range of motion.   Neurological: She is alert and oriented to person, place, and time.   Skin: Skin is warm and dry. She is not diaphoretic.   Vitals reviewed.              Assessment/Plan:     1. Dysuria    - POCT Urinalysis  - POCT PREGNANCY: NEG    2. Acute cystitis without hematuria    - cefUROXime (CEFTIN) 250 MG Tab; Take 1 Tab by mouth 2 times a day for 7 days.  Dispense: 14 Tab; Refill: 0  - URINE CULTURE(NEW); Future    Increase water intake  Urinate more frequently and empty bladder completely  Practice good toileting hygiene after " bowel movements and sexual intercourse, refrain from sexual intercourse until infection cleared  Monitor for back/flank pain, difficulty urinating, blood in urine- need re-evaluation    MyChart urine culture release- results released with notes

## 2019-05-17 LAB
BACTERIA UR CULT: ABNORMAL
BACTERIA UR CULT: ABNORMAL
SIGNIFICANT IND 70042: ABNORMAL
SITE SITE: ABNORMAL
SOURCE SOURCE: ABNORMAL

## 2019-06-10 DIAGNOSIS — E10.9 TYPE 1 DIABETES MELLITUS WITHOUT COMPLICATION (HCC): ICD-10-CM

## 2019-06-14 ENCOUNTER — TELEPHONE (OUTPATIENT)
Dept: NEUROLOGY | Facility: MEDICAL CENTER | Age: 34
End: 2019-06-14

## 2019-06-14 NOTE — TELEPHONE ENCOUNTER
Called and lvm for pt to call back and reschedule with new provider since dr martino resigned. I advised her that appt will be cancelled. Call back number provided.

## 2019-06-17 ENCOUNTER — TELEMEDICINE2 (OUTPATIENT)
Dept: URGENT CARE | Facility: PHYSICIAN GROUP | Age: 34
End: 2019-06-17
Payer: COMMERCIAL

## 2019-06-17 DIAGNOSIS — R21 RASH: ICD-10-CM

## 2019-06-17 DIAGNOSIS — B35.4 TINEA CORPORIS: ICD-10-CM

## 2019-06-17 PROCEDURE — 99213 OFFICE O/P EST LOW 20 MIN: CPT | Performed by: FAMILY MEDICINE

## 2019-06-17 RX ORDER — FLUCONAZOLE 150 MG/1
TABLET ORAL
Qty: 3 TAB | Refills: 0 | Status: SHIPPED | OUTPATIENT
Start: 2019-06-17 | End: 2019-10-21

## 2019-06-17 RX ORDER — KETOCONAZOLE 20 MG/G
CREAM TOPICAL
Qty: 30 G | Refills: 2 | Status: SHIPPED | OUTPATIENT
Start: 2019-06-17 | End: 2019-10-21

## 2019-06-17 NOTE — PROGRESS NOTES
Chief Complaint:    Chief Complaint   Patient presents with   • Rash     Right Arm x 5 days       History of Present Illness:    This is a new problem. She started with itchy rash on right forearm on 6/12/19. She feels it is ringworm since there is a red ring on the outer border. The rash is dry and flaky. 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 She tried Clotrimazole cream without help.      Review of Systems:    Constitutional: Negative for fever, chills, and diaphoresis.   Eyes: Negative for change in vision, photophobia, pain, redness, and discharge.  ENT: Negative for ear pain, ear discharge, hearing loss, tinnitus, nasal congestion, nosebleeds, and sore throat.    Respiratory: Negative for cough, hemoptysis, sputum production, shortness of breath, wheezing, and stridor.    Cardiovascular: Negative for chest pain, palpitations, orthopnea, claudication, leg swelling, and PND.   Gastrointestinal: Negative for abdominal pain, nausea, vomiting, diarrhea, constipation, blood in stool, and melena.   Genitourinary: Negative for dysuria, urinary urgency, urinary frequency, hematuria, and flank pain.   Musculoskeletal: Negative for myalgias, joint pain, neck pain, and back pain.   Skin: See HPI.  Neurological: No new symptoms.   Endo: Diabetic and Hypothyroid, on medication.  Heme: Does not bruise/bleed easily.   Psychiatric/Behavioral: Negative for depression, suicidal ideas, hallucinations, memory loss and substance abuse. The patient is not nervous/anxious and does not have insomnia.        Past Medical History:    Past Medical History:   Diagnosis Date   • Asthma    • Diabetes    • Retinopathy due to secondary diabetes (HCC)    • Thyroid disorder      Past Surgical History:    Past Surgical History:   Procedure Laterality Date   • CARPAL TUNNEL ENDOSCOPIC  5/24/2011    Performed by WAYLON COLEMAN at [facility] ORS   • VITRECTOMY POSTERIOR  5/4/2009     Performed by DARYL DASILVA at SURGERY SAME DAY [facility] ORS   • EYE CRYOSURGERY  5/4/2009    Performed by DARYL DASILVA at SURGERY SAME DAY [facility] ORS   • RETINAL DETACHMENT REPAIR  7/11/2008    left/right eye     Social History:    Social History     Social History   • Marital status:      Spouse name: N/A   • Number of children: N/A   • Years of education: N/A     Occupational History   • Not on file.     Social History Main Topics   • Smoking status: Never Smoker   • Smokeless tobacco: Never Used   • Alcohol use 0.6 oz/week     1 Glasses of wine per week   • Drug use: No   • Sexual activity: Yes     Partners: Male     Other Topics Concern   • Not on file     Social History Narrative   • No narrative on file     Family History:    Family History   Problem Relation Age of Onset   • Hyperlipidemia Father    • Diabetes Unknown    • Hypertension Unknown    • Stroke Unknown      Medications:    Current Outpatient Prescriptions on File Prior to Visit   Medication Sig Dispense Refill   • NOVOLOG, insulin aspart, (NOVOLOG FLEXPEN) 100 UNIT/ML Solution Pen-injector injection Inject 5-15 Units as instructed 3 times a day before meals. 15 mL 99   • levothyroxine (SYNTHROID) 125 MCG Tab TAKE 1 TABLET BY MOUTH EVERY MORNING ON AN EMPTY STOMACH 90 Tab 12   • gabapentin (NEURONTIN) 300 MG Cap Take 1 Cap by mouth 3 times a day as needed. 90 Cap 5   • CYCLAFEM 1/35 1-35 MG-MCG per tablet Take 1 Tab by mouth every day.  11   • cetirizine (ZYRTEC) 10 MG TABS Take 10 mg by mouth every day.     • Blood Glucose Test Strips Test strips order: Test strips for insurance approved meter. Sig: use 4x per day and prn ssx high or low sugar 360 Each 11   • Insulin Degludec (TRESIBA FLEXTOUCH) 100 UNIT/ML Solution Pen-injector Inject 40 Units as instructed every bedtime. 20 PEN 99   • Blood Glucose Meter Kit Test blood sugar as recommended by provider. 1 Kit 0   • Blood Glucose Test Strips Use one strip to test blood sugar  three times daily. 300 Strip 3   • Lancets Use one lancet to test blood sugar three times daily. 300 Each 3   • albuterol 108 (90 Base) MCG/ACT Aero Soln inhalation aerosol Inhale 2 Puffs by mouth every four hours as needed for Shortness of Breath. 1 Inhaler 3   • Blood Glucose Monitoring Suppl Supplies Misc Test strips order: Test strips for Abbott Freestyle Lite meter. Sig: use 3 times a day and prn ssx high or low sugar #300 RF x 3 300 Strip 3   • pantoprazole (PROTONIX) 40 MG Tablet Delayed Response Take 1 Tab by mouth every day.  11     No current facility-administered medications on file prior to visit.      Allergies:    Allergies   Allergen Reactions   • Percocet [Oxycodone-Acetaminophen] Itching     And nausea   • Vicodin [Hydrocodone-Acetaminophen]        Vitals:    No vitals taken for this visit.      Physical Exam:    Constitutional: Appears well-developed and well-nourished. No acute distress.   Eyes: Sclera white.  ENT: Head is grossly normal. External ears grossly normal. Hearing grossly normal. Nose grossly normal.   Pulmonary/Chest: Respirations non-labored. Speaks in full sentences.  Musculoskeletal: Normal range of motion.  Neurological: Alert and oriented to person, place, and time.   Skin: Right forearm has red focal rash that I cannot see clearly due to poor video feed.  Psychiatric: Normal mood and affect. Behavior is normal. Judgment and thought content normal.       Assessment / Plan:    1. Rash  - fluconazole (DIFLUCAN) 150 MG tablet; 1 TAB BY MOUTH ONCE A DAY X 3 DAYS.  Dispense: 3 Tab; Refill: 0  - ketoconazole (NIZORAL) 2 % Cream; APPLY TO AFFECTED AREA TWICE A DAY ONLY IF NEEDED FOR RASH.  Dispense: 30 g; Refill: 2    2. Tinea corporis  - fluconazole (DIFLUCAN) 150 MG tablet; 1 TAB BY MOUTH ONCE A DAY X 3 DAYS.  Dispense: 3 Tab; Refill: 0  - ketoconazole (NIZORAL) 2 % Cream; APPLY TO AFFECTED AREA TWICE A DAY ONLY IF NEEDED FOR RASH.  Dispense: 30 g; Refill: 2      Discussed with her  DDX, management options, and risks, benefits, and alternatives to treatment plan agreed upon.    Advised the video feed is poor and I cannot get a good look at her rash, but we can treat as ringworm due to info noted in HPI. She would like and wants to be aggressive.    Agreeable to medications prescribed.    Advised if getting worse or not better with meds, she needs to be seen in person where a better look can be obtained than what can be seen on the poor video feed.

## 2019-06-20 ENCOUNTER — TELEPHONE (OUTPATIENT)
Dept: MEDICAL GROUP | Facility: PHYSICIAN GROUP | Age: 34
End: 2019-06-20

## 2019-06-20 DIAGNOSIS — E10.9 TYPE 1 DIABETES MELLITUS WITHOUT COMPLICATION (HCC): ICD-10-CM

## 2019-06-20 NOTE — TELEPHONE ENCOUNTER
FINAL PRIOR AUTHORIZATION STATUS:    1.  Name of Medication & Dose: tresiba     2. Prior Auth Status: Denied.  Reason: 20 pens per month exeeds plan limits of #4 pens (12ml) per 30 days.    3. Action Taken: Pharmacy Notified: no Patient Notified: no

## 2019-06-21 NOTE — TELEPHONE ENCOUNTER
1. Caller Name: Nita   Call Back Number: [phone] (home)         Patient approves a detailed voicemail message: yes    LVM notifying pt that Dr. Corbin sent in a prescription for Tresiba 4 Pens with 11 refills.

## 2019-08-05 ENCOUNTER — TELEPHONE (OUTPATIENT)
Dept: MEDICAL GROUP | Facility: PHYSICIAN GROUP | Age: 34
End: 2019-08-05

## 2019-08-05 NOTE — TELEPHONE ENCOUNTER
DOCUMENTATION OF PAR STATUS:    1. Name of Medication & Dose: Tresiba FlexTouch     2. Name of Prescription Coverage Company & phone #: [employer]    3. Date Prior Auth Submitted: 08/05/2019    4. What information was given to obtain insurance decision? Cover My Meds    5. Prior Auth Status? Pending    6. Patient Notified: yes

## 2019-08-08 NOTE — TELEPHONE ENCOUNTER
FINAL PRIOR AUTHORIZATION STATUS:    1.  Name of Medication & Dose: Tresiba Flextouch      2. Prior Auth Status: Approved through 08/07/19-08/06/21     3. Action Taken: Pharmacy Notified: yes Patient Notified: yes

## 2019-10-21 ENCOUNTER — OFFICE VISIT (OUTPATIENT)
Dept: NEUROLOGY | Facility: MEDICAL CENTER | Age: 34
End: 2019-10-21
Payer: COMMERCIAL

## 2019-10-21 VITALS
BODY MASS INDEX: 32.13 KG/M2 | DIASTOLIC BLOOD PRESSURE: 80 MMHG | WEIGHT: 212 LBS | HEIGHT: 68 IN | SYSTOLIC BLOOD PRESSURE: 134 MMHG | HEART RATE: 88 BPM

## 2019-10-21 DIAGNOSIS — E10.42 DIABETIC POLYNEUROPATHY ASSOCIATED WITH TYPE 1 DIABETES MELLITUS (HCC): Primary | ICD-10-CM

## 2019-10-21 DIAGNOSIS — G25.81 RLS (RESTLESS LEGS SYNDROME): ICD-10-CM

## 2019-10-21 PROCEDURE — 99205 OFFICE O/P NEW HI 60 MIN: CPT | Performed by: PSYCHIATRY & NEUROLOGY

## 2019-10-21 RX ORDER — GABAPENTIN 600 MG/1
600 TABLET ORAL EVERY EVENING
Qty: 90 TAB | Refills: 3 | Status: SHIPPED | OUTPATIENT
Start: 2019-10-21 | End: 2019-11-25 | Stop reason: SDUPTHER

## 2019-10-21 RX ORDER — OMEPRAZOLE 40 MG/1
40 CAPSULE, DELAYED RELEASE ORAL DAILY
COMMUNITY
End: 2022-06-28

## 2019-10-21 RX ORDER — PREGABALIN 50 MG/1
CAPSULE ORAL
Qty: 120 CAP | Refills: 1 | Status: SHIPPED | OUTPATIENT
Start: 2019-10-21 | End: 2019-11-22 | Stop reason: SDUPTHER

## 2019-10-22 PROBLEM — G25.81 RLS (RESTLESS LEGS SYNDROME): Status: ACTIVE | Noted: 2019-10-22

## 2019-10-22 ASSESSMENT — ENCOUNTER SYMPTOMS
FOCAL WEAKNESS: 0
BLURRED VISION: 1
TREMORS: 0
DOUBLE VISION: 0
CONSTIPATION: 0
SENSORY CHANGE: 1
WEIGHT LOSS: 0
TINGLING: 1
MEMORY LOSS: 0
WEAKNESS: 0

## 2019-10-22 NOTE — PROGRESS NOTES
Subjective:      Nita Jackman is a 34 y.o. female who presents from the office of GEORGES Breen, for consultation, with a history of a painful, (presumed) diabetes-related sensory polyneuropathy.     PENNY Moya is a pleasant 34-year-old left-handed  female whose diabetes was diagnosed at 7.5 years of age, but whose pain symptoms started about 2 years ago, and which have slowly and steadily intensified.    She describes a constant aching sensation that is deep inside the legs, but a burning sensation that originally started in the toes and soles of both feet, now spreading proximally up the dorsum below the ankles.  There is episodic cold and burning sensations on top of the aching sensation.  Originally episodic, the symptoms are now constant with fluctuations depending on activity.  Given that she works as a , she is on her feet all day, this has become a bit of a problem!    She nowadays has cramping in the calves, especially at night.  She denies any weakness with a foot drop and frequent tripping, though she can lose her balance.  There is some mild unsteadiness at times, most keenly aware when she walks on uneven surfaces and when it gets darker.    She denies any loss of sensation or numbness, she has not been injuring her feet, but she is much more cautious.  Fortunately, other than some episodic aching sensations in her hands, these have not been similarly involved.  She is status post bilateral CTS release surgery in 2010, the symptoms have completely resolved, what she suffers from now in her hands is vastly different.    She also describes a symptomatic restlessness that predated the painful symptoms from the neuropathy.  About 3 years ago she began to notice unusual feelings in the legs that worsens in the afternoon and evenings, especially when she was immobile and seated, relieved temporarily with standing and walking, moving the extremities, etc.  Sensory  distraction has had no benefit.  The symptoms have worsened over time, they are different than the sensory complaints stated above.    She denies any involvement of cognitive function, cranial nerve function, or cerebellar dysfunction.  She does have significant visual distortion related to the diabetes and a proliferative retinopathy, especially with the right eye.    Directed work-up has not been performed.  On gabapentin 600 mg nightly, she could not tolerate higher doses, this has provided some but suboptimal benefit.    In addition to the diabetes, she has no nephropathy, she also suffers from Hashimoto's disease with symptomatic hypothyroidism and asthma, no history of autoimmune disease, malignancy, MS, seizure, migraine, hypertension, dyslipidemia, liver disease, blood dyscrasia with anemia, neurodegenerative disease, LILA, CAD or arrhythmia.  Surgery is unremarkable other than her bilateral CTS release surgery.    Her mother suffers from breast cancer and also Hashimoto's disease, her father and brother are both alive and well.  Known in the family has symptoms similar to hers.  Menses are irregular, she is on birth control to help with this.    She does not smoke or vape, occasionally drinks alcohol, her profession is as a .    She is on Neurontin 600 mg every evening, Synthroid 0.125 mg daily, NovoLog insulin, and Tresiba.    Review of Systems   Constitutional: Negative for malaise/fatigue and weight loss.   Eyes: Positive for blurred vision. Negative for double vision.   Cardiovascular: Negative for leg swelling.   Gastrointestinal: Negative for constipation.   Genitourinary: Negative for frequency and hematuria.   Neurological: Positive for tingling and sensory change. Negative for tremors, focal weakness and weakness.   Psychiatric/Behavioral: Negative for memory loss.   All other systems reviewed and are negative.       Objective:     /80 (BP Location: Left arm, Patient  "Position: Sitting, BP Cuff Size: Adult)   Pulse 88   Ht 1.727 m (5' 8\")   Wt 96.2 kg (212 lb)   BMI 32.23 kg/m²      Physical Exam    She appears in no acute distress.  Her vital signs are stable.  There is no malar rash.  The neck is supple, range of motion is full.  Cardiac evaluation reveals a regular rhythm.  Straight leg raising is negative bilaterally.  Distal pulses are intact though her feet are cool to touch.  Skin shows good tone and normal turgor.  There are no poorly healing ulcerations nor evidence of vascular insufficiency change.    She is fully oriented, there is no aphasia, apraxia, or inattention.    PERRLA/EOMI, visual fields are full to finger counting on confrontation on the left, she has a temporal hemianopsia on the right.  Facial movements are symmetric, sensory exam is intact to light touch, temperature and pinprick bilaterally, the tongue and uvula are midline without bulbar dysfunction, jaw jerk is absent, shoulder shrug and head rotation are intact and symmetric.    Musculoskeletal exam reveals normal tone throughout, there is no tremor, asterixis, or drift.  Strength is intact in all 4 extremities, attention paid to the lower extremities distally.  Reflexes are absent at the knees and ankles, easily elicited and symmetric in the upper extremities.  Both toes are downgoing.    Coordination exam reveals intact fine motor control and repetitive movements in all 4 extremities with normal and symmetric amplitude and frequencies.  There is no appendicular dystaxia with any of the extremities.  She walks with normal station, heel, toe, and tandem walking can all be done without the need for assistance.  Arm swing is symmetric.    Sensory exam reveals decrement of vibration in the toes, JPS is intact, pinprick is impaired below the midshin though temperature is intact.  Romberg is present.     Assessment/Plan:     1. Diabetic polyneuropathy associated with type 1 diabetes mellitus (HCC)  She " is suffering from a small fiber, painful sensory neuropathy, most likely related to her diabetes, the findings on exam are consistent, as is her history of symptoms.  Work-up should be performed to confirm this fact but also as a staging and baseline study.  She may have asymptomatic motor involvement, quite typical with this type of neuropathy.  The unfortunate thing is that she is quite young, thus a long time presents itself for progression if good control of her diabetes can be maintained.  She understands these risks.  EMG/NCV studies are indicated, imaging of the lumbar and cervical spines are not at this time.  Depending on these results, additional blood work might be required.    Symptomatically, she will require additional medication, gabapentin cannot be pushed forwards because of poor tolerability.  In addition, there is some data, though soft, supporting several vitamin and mineral supplements which can be helpful in slowing progression of neuropathy as well as other diabetes-related complications.  A list was provided includin.  Vitamin C 1000 mg daily  2.  Vitamin D 400 I.  U.  Daily  3.  Magnesium 5 mg daily  4.  Zinc 50 mg daily  5.  Beta-carotene 15 mg daily  6.  Baby aspirin daily  7.  Calcium and vitamin D supplement, I recommended Os-Juan Antonio twice daily.    Lyrica 50 mg, twice daily will be started, increased in 3 weeks to 3 times daily dosing.  Side effects were reviewed.  We spent a long time discussing all of the above, as well as likely prognosis, etc.  We will follow-up on the next several months, we will call her with test results if necessary, otherwise we talk specifics at her return office visit.  We can adjust medications over the phone.    2.  RLS  Likely a separate issue, the symptoms had started prior to the neuropathic symptoms beginning, people with neuropathy are at greater risk of developing symptomatic restlessness.  Unfortunately, the data supporting treatment is less  "consistent then with primary RLS, though the medications that are used are still of the same nature.  Gabapentin has been of limited benefit, Lyrica can show some benefit though it is off label.  Dopamine agonists would be the fall back, I would like to see if we can \"kill 2 birds with one stone\" with the Lyrica first.  According to the patient, routine blood counts have been stable (the last I have was normal from 2012), I have no reason to believe that this is symptomatic of an iron deficiency condition, thus ferritin and iron studies are obviated at this time.  With Lyrica, typically doses need to exceed 100 mg daily.    - REFERRAL TO NEURODIAGNOSTICS (EEG,EP,EMG/NCS/DBS) Modality Requested: EMG/NCS-Comment Extremities  - pregabalin (LYRICA) 50 MG capsule; 1 tab bid for 2 weeks, then 2 tab bid  Dispense: 120 Cap; Refill: 1  - gabapentin (NEURONTIN) 600 MG tablet; Take 1 Tab by mouth every evening.  Dispense: 90 Tab; Refill: 3    Time: 60-minute spent face-to-face for exam, review, discussion, and education, of this over 50% of the time spent counseling and coordinating care.  "

## 2019-11-08 ENCOUNTER — TELEPHONE (OUTPATIENT)
Dept: HEALTH INFORMATION MANAGEMENT | Facility: OTHER | Age: 34
End: 2019-11-08

## 2019-11-08 DIAGNOSIS — E03.9 ACQUIRED HYPOTHYROIDISM: ICD-10-CM

## 2019-11-08 NOTE — TELEPHONE ENCOUNTER
Good afternoon Janak,    This pt would like to completed labs so she can get her Levothyroxine refill. Please review.    Thank you,    Milagro

## 2019-11-09 PROBLEM — E03.9 ACQUIRED HYPOTHYROIDISM: Status: ACTIVE | Noted: 2019-11-09

## 2019-11-22 DIAGNOSIS — E10.42 DIABETIC POLYNEUROPATHY ASSOCIATED WITH TYPE 1 DIABETES MELLITUS (HCC): ICD-10-CM

## 2019-11-22 NOTE — TELEPHONE ENCOUNTER
Was the patient seen in the last year in this department? Yes    Does patient have an active prescription for medications requested? Yes    Received Request Via: Patient     Patient is requesting that we send it to pill pack.

## 2019-11-25 RX ORDER — GABAPENTIN 600 MG/1
600 TABLET ORAL EVERY EVENING
Qty: 90 TAB | Refills: 3 | Status: SHIPPED | OUTPATIENT
Start: 2019-11-25 | End: 2021-01-07 | Stop reason: SDUPTHER

## 2019-11-25 RX ORDER — PREGABALIN 50 MG/1
CAPSULE ORAL
Qty: 120 CAP | Refills: 1 | Status: SHIPPED | OUTPATIENT
Start: 2019-11-25 | End: 2019-12-10 | Stop reason: SDUPTHER

## 2019-11-30 ENCOUNTER — OFFICE VISIT (OUTPATIENT)
Dept: URGENT CARE | Facility: PHYSICIAN GROUP | Age: 34
End: 2019-11-30
Payer: COMMERCIAL

## 2019-11-30 VITALS
HEART RATE: 98 BPM | RESPIRATION RATE: 18 BRPM | BODY MASS INDEX: 31.83 KG/M2 | OXYGEN SATURATION: 97 % | WEIGHT: 210 LBS | HEIGHT: 68 IN | DIASTOLIC BLOOD PRESSURE: 90 MMHG | SYSTOLIC BLOOD PRESSURE: 160 MMHG | TEMPERATURE: 98.4 F

## 2019-11-30 DIAGNOSIS — J06.9 UPPER RESPIRATORY TRACT INFECTION, UNSPECIFIED TYPE: ICD-10-CM

## 2019-11-30 DIAGNOSIS — R06.2 INSPIRATORY WHEEZE ON EXAMINATION: ICD-10-CM

## 2019-11-30 DIAGNOSIS — R05.9 COUGH: ICD-10-CM

## 2019-11-30 DIAGNOSIS — J02.9 PHARYNGITIS, UNSPECIFIED ETIOLOGY: ICD-10-CM

## 2019-11-30 LAB
INT CON NEG: NEGATIVE
INT CON POS: POSITIVE
S PYO AG THROAT QL: NORMAL

## 2019-11-30 PROCEDURE — 87880 STREP A ASSAY W/OPTIC: CPT | Performed by: NURSE PRACTITIONER

## 2019-11-30 PROCEDURE — 99214 OFFICE O/P EST MOD 30 MIN: CPT | Performed by: NURSE PRACTITIONER

## 2019-11-30 RX ORDER — FLUTICASONE PROPIONATE 110 UG/1
2 AEROSOL, METERED RESPIRATORY (INHALATION) 2 TIMES DAILY
Qty: 1 INHALER | Refills: 0 | Status: SHIPPED | OUTPATIENT
Start: 2019-11-30 | End: 2020-05-05

## 2019-11-30 RX ORDER — ALBUTEROL SULFATE 90 UG/1
2 AEROSOL, METERED RESPIRATORY (INHALATION) EVERY 6 HOURS PRN
Qty: 8.5 G | Refills: 0 | Status: SHIPPED | OUTPATIENT
Start: 2019-11-30 | End: 2020-01-10

## 2019-11-30 ASSESSMENT — ENCOUNTER SYMPTOMS
FEVER: 0
COUGH: 1
HEARTBURN: 0
HEADACHES: 0
NAUSEA: 0
SORE THROAT: 1
NECK PAIN: 0
CHILLS: 0

## 2019-11-30 NOTE — PROGRESS NOTES
Subjective:      Nita Jackman is a 34 y.o. female who presents with Sore Throat (sore throat x1 day)            URI    This is a new problem. The current episode started yesterday. The problem has been gradually worsening. There has been no fever. Associated symptoms include coughing, ear pain and a sore throat. Pertinent negatives include no congestion, headaches, nausea or neck pain. Associated symptoms comments: History of asthma. She has tried NSAIDs for the symptoms. The treatment provided mild relief.       Review of Systems   Constitutional: Negative for chills and fever.   HENT: Positive for ear pain and sore throat. Negative for congestion.    Respiratory: Positive for cough.    Gastrointestinal: Negative for heartburn and nausea.   Musculoskeletal: Negative for neck pain.   Neurological: Negative for headaches.     Past Medical History:   Diagnosis Date   • Asthma    • Diabetes    • Retinopathy due to secondary diabetes (HCC)    • Thyroid disorder       Past Surgical History:   Procedure Laterality Date   • CARPAL TUNNEL ENDOSCOPIC  5/24/2011    Performed by WAYLON COLEMAN at SURGERY [facility]S   • VITRECTOMY POSTERIOR  5/4/2009    Performed by DARYL DASILVA at SURGERY [facility] ORS   • EYE CRYOSURGERY  5/4/2009    Performed by DARYL DASILVA at SURGERY [facility] DAY [facility] ORS   • RETINAL DETACHMENT REPAIR  7/11/2008    left/right eye      Social History     Socioeconomic History   • Marital status:      Spouse name: Not on file   • Number of children: Not on file   • Years of education: Not on file   • Highest education level: Not on file   Occupational History   • Not on file   Social Needs   • Financial resource strain: Not on file   • Food insecurity:     Worry: Not on file     Inability: Not on file   • Transportation needs:     Medical: Not on file     Non-medical: Not on file   Tobacco Use   • Smoking status: Never Smoker   • Smokeless tobacco: Never Used   Substance  "and Sexual Activity   • Alcohol use: Yes     Alcohol/week: 0.6 oz     Types: 1 Glasses of wine per week   • Drug use: No   • Sexual activity: Yes     Partners: Male     Birth control/protection: Other-See Comments   Lifestyle   • Physical activity:     Days per week: Not on file     Minutes per session: Not on file   • Stress: Not on file   Relationships   • Social connections:     Talks on phone: Not on file     Gets together: Not on file     Attends L[employer] day service: Not on file     Active member of club or organization: Not on file     Attends meetings of clubs or organizations: Not on file     Relationship status: Not on file   • Intimate partner violence:     Fear of current or ex partner: Not on file     Emotionally abused: Not on file     Physically abused: Not on file     Forced sexual activity: Not on file   Other Topics Concern   • Not on file   Social History Narrative   • Not on file    Allergies: Percocet [oxycodone-acetaminophen] and Vicodin [hydrocodone-acetaminophen]         Objective:     /90 (BP Location: Right arm, Patient Position: Sitting, BP Cuff Size: Adult)   Pulse 98   Temp 36.9 °C (98.4 °F) (Temporal)   Resp 18   Ht 1.727 m (5' 8\")   Wt 95.3 kg (210 lb)   SpO2 97%   BMI 31.93 kg/m²      Physical Exam  Vitals signs reviewed.   Constitutional:       Appearance: Normal appearance.   HENT:      Right Ear: Ear canal and external ear normal. Tympanic membrane is bulging.      Left Ear: Ear canal and external ear normal. Tympanic membrane is bulging.      Nose: Nose normal.      Mouth/Throat:      Pharynx: Uvula midline. Posterior oropharyngeal erythema present.      Comments: Post nasal drip noted  Cardiovascular:      Rate and Rhythm: Normal rate and regular rhythm.      Heart sounds: Normal heart sounds.   Pulmonary:      Breath sounds: Examination of the left-upper field reveals wheezing. Wheezing present.      Comments: All lung fields tight, productive cough noted  Skin:     " General: Skin is warm.   Neurological:      Mental Status: She is alert and oriented to person, place, and time.   Psychiatric:         Mood and Affect: Mood normal.         Behavior: Behavior normal.         Thought Content: Thought content normal.         Judgment: Judgment normal.                 Assessment/Plan:     1. Upper respiratory tract infection, unspecified type    2. Cough  - albuterol 108 (90 Base) MCG/ACT Aero Soln inhalation aerosol; Inhale 2 Puffs by mouth every 6 hours as needed for Shortness of Breath.  Dispense: 8.5 g; Refill: 0    3. Inspiratory wheeze on examination  - fluticasone (FLOVENT HFA) 110 MCG/ACT Aerosol; Inhale 2 Puffs by mouth 2 times a day.  Dispense: 1 Inhaler; Refill: 0  - albuterol 108 (90 Base) MCG/ACT Aero Soln inhalation aerosol; Inhale 2 Puffs by mouth every 6 hours as needed for Shortness of Breath.  Dispense: 8.5 g; Refill: 0    4. Pharyngitis, unspecified etiology  - POCT Rapid Strep A - negative      Discussed physical examination findings as well as negative in clinic testing are indicative of viral upper respiratory infection.  Will treat wheezes with inhaled medications.  Patient may also use over-the-counter NSAIDs and Tylenol per 's instructions as well as Mucinex.  Instructed patient to increase fluids    Instructed patient to return to clinic for worsening symptoms or symptoms that persist for 7 to 10 days     Supportive care, differential diagnoses, and indications for immediate follow-up discussed with patient.    Pathogenesis of diagnosis discussed including typical length and natural progression. Patient expresses understanding and agrees to plan.      Please note that this dictation was created using voice recognition software. I have made every reasonable attempt to correct obvious errors, but I expect that there are errors of grammar and possibly content that I did not discover before finalizing the note.    - albuterol 108 (90 Base) MCG/ACT  Aero Soln inhalation aerosol; Inhale 2 Puffs by mouth every 6 hours as needed for Shortness of Breath.  Dispense: 8.5 g; Refill: 0

## 2019-12-02 ENCOUNTER — HOSPITAL ENCOUNTER (OUTPATIENT)
Dept: LAB | Facility: MEDICAL CENTER | Age: 34
End: 2019-12-02
Attending: FAMILY MEDICINE
Payer: COMMERCIAL

## 2019-12-02 DIAGNOSIS — E03.9 ACQUIRED HYPOTHYROIDISM: ICD-10-CM

## 2019-12-02 LAB
T4 FREE SERPL-MCNC: 0.43 NG/DL (ref 0.53–1.43)
TSH SERPL DL<=0.005 MIU/L-ACNC: 38.02 UIU/ML (ref 0.38–5.33)

## 2019-12-02 PROCEDURE — 84480 ASSAY TRIIODOTHYRONINE (T3): CPT

## 2019-12-02 PROCEDURE — 84439 ASSAY OF FREE THYROXINE: CPT

## 2019-12-02 PROCEDURE — 84443 ASSAY THYROID STIM HORMONE: CPT

## 2019-12-02 PROCEDURE — 36415 COLL VENOUS BLD VENIPUNCTURE: CPT

## 2019-12-03 DIAGNOSIS — Z76.89 ENCOUNTER TO ESTABLISH CARE WITH NEW DOCTOR: ICD-10-CM

## 2019-12-03 LAB — T3 SERPL-MCNC: 77.4 NG/DL (ref 60–181)

## 2019-12-03 RX ORDER — LEVOTHYROXINE SODIUM 0.12 MG/1
125 TABLET ORAL EVERY MORNING
Qty: 90 TAB | Refills: 3 | Status: SHIPPED | OUTPATIENT
Start: 2019-12-03 | End: 2020-12-29 | Stop reason: SDUPTHER

## 2019-12-10 DIAGNOSIS — E10.42 DIABETIC POLYNEUROPATHY ASSOCIATED WITH TYPE 1 DIABETES MELLITUS (HCC): ICD-10-CM

## 2019-12-10 RX ORDER — PREGABALIN 50 MG/1
CAPSULE ORAL
Qty: 120 CAP | Refills: 1 | Status: SHIPPED | OUTPATIENT
Start: 2019-12-10 | End: 2020-01-10 | Stop reason: SDUPTHER

## 2019-12-12 ENCOUNTER — NON-PROVIDER VISIT (OUTPATIENT)
Dept: NEUROLOGY | Facility: MEDICAL CENTER | Age: 34
End: 2019-12-12
Payer: COMMERCIAL

## 2019-12-12 DIAGNOSIS — E10.42 DIABETIC POLYNEUROPATHY ASSOCIATED WITH TYPE 1 DIABETES MELLITUS (HCC): ICD-10-CM

## 2019-12-12 PROCEDURE — 95912 NRV CNDJ TEST 11-12 STUDIES: CPT | Performed by: PSYCHIATRY & NEUROLOGY

## 2019-12-12 PROCEDURE — 95886 MUSC TEST DONE W/N TEST COMP: CPT | Performed by: PSYCHIATRY & NEUROLOGY

## 2019-12-12 NOTE — PROCEDURES
"NERVE CONDUCTION STUDIES AND ELECTROMYOGRAPHY REPORT  [facility]  12/12/19           IMPRESSION:  This is an abnormal electrodiagnostic study due to evidence suggestive of a symmetric, length dependent, axonal, sensorimotor polyneuropathy.  There is no evidence of right cervical/lumbosacral radiculopathy, or right median/ulnar neuropathy.  Recommend clinical correlation of the above.  Of note, this test does not evaluate for small fiber neuropathy      Tiffany Shanks MD  Neurology - Neurophysiology  [facility]        REASON FOR REFERRAL:  Ms. Nita Jackman 34 y.o. referred by Dr. Steven Ashby for evaluation of a constant aching sensation deep inside the legs.  She also has a burning sensation that started in the toes and soles of both feet that has spread proximally to the dorsum below the ankles.  She has a history of type 1 diabetes diagnosed at 7.5 years of age.  Symptoms have been ongoing for approximately 2 years with gradual progression.  Patient works as a .  She also has a history of bilateral carpal tunnel syndrome is having heaviness of the bilateral upper extremities of uncertain etiology.    Height: 5'8\"  Weight: 210 lbs      ELECTRODIAGNOSTIC EXAMINATION:  Nerve conduction studies (NCS) and electromyography (EMG) are utilized to evaluate direct or indirect damage to the peripheral nervous system. NCS are performed to measure the nerve(s) response(s) to electrostimulation across a given nerve segment. EMG evaluates the passive and active electrical activity of the muscle(s) in question.  Muscles are innervated by specific peripheral nerves and roots. Often times, several nerves the muscle to be examined in order to determine the presence or absence of the disease process. Furthermore, nerves and muscles may need to be tested in a [id number] comparison, as well as in additional extremities, as this may be crucial in characterizing the extent of " the disease process, which may be diffuse or isolated and of varying degree of severity. The extent of the neurodiagnostic exam is justified as it may help arrive to a proper diagnosis, which ultimately may contribute to better management of the patient. Therefore, the nerves to muscles examined during the study were medically necessary.    Unless otherwise noted, temperature of the extremity(s) study was monitored before and during the examination and remained between 32 and 36 degrees C for the upper extremities, and between 30 and 36 degrees C for the lower extremities. The patient tolerated testing well, without any complications.       NERVE CONDUCTION STUDY SUMMARY:  Selected nerves of the bilateral lower extremity and right upper extremity are studied.    Normal right median sensory and motor responses.  Normal right ulnar sensory and motor responses.  Abnormal bilateral sural sensory responses due to low amplitude.  Abnormal left common peroneal motor response at the extensor digitorum brevis due to slowing of uncertain significance.  Abnormal right common peroneal motor response at the extensor digitorum brevis due to low amplitude which can be secondary to local wear and tear.  Normal bilateral common peroneal motor responses at the tibialis anterior.  Abnormal bilateral tibial motor responses at the abductor hallucis brevis due to low amplitude and secondary slowing.    NEEDLE EMG SUMMARY:  Concentric needle study of selected right upper and lower extremity muscles is performed.     Insertion is normal in all muscles sampled. With activation, there are normal morphology (amplitude/duration) motor unit action potentials firing with normal recruitment.       PATIENT DATA TABLES  Nerve Conduction Studies     Stim Site NR Onset (ms) Norm Onset (ms) O-P Amp (µV) Norm O-P Amp Site1 Site2 Delta-P (ms) Dist (cm) Mateo (m/s) Norm Mateo (m/s)   Right Median Anti Sensory (2nd Digit)  34.5°C   Wrist    2.8 <3.4 21.1 >20  Wrist 2nd Digit 3.7 14.0 *38 >50   Left Sural Anti Sensory (Lat Mall)  34.5°C   Calf    3.9 <4.5 *1.4 >5 Calf Lat Mall 4.9 14.0 *29 >40   Right Sural Anti Sensory (Lat Mall)  34.5°C   Calf    4.1 <4.5 *1.2 >5 Calf Lat Mall 5.0 14.0 *28 >40   Right Ulnar Anti Sensory (5th Digit)  34.5°C   Wrist    2.7 <3.1 13.5 >12 Wrist 5th Digit 3.5 14.0 *40 >50        Stim Site NR Onset (ms) Norm Onset (ms) O-P Amp (mV) Norm O-P Amp Site1 Site2 Delta-0 (ms) Dist (cm) Mateo (m/s) Norm Mateo (m/s)   Right Median Motor (Abd Poll Brev)  34.5°C   Wrist    3.3 <3.9 10.6 >6 Elbow Wrist 5.1 27.0 53 >50   Elbow    8.4  9.6          Left Peroneal EDB Motor (Ext Dig Brev)  34.5°C   Ankle    3.7 <5.5 3.1 >3.0 B Fib Ankle 9.9 33.0 *33 >40   B Fib    13.6  2.6  Poplt B Fib 2.6 10.0 38    Poplt    16.2  2.6          Right Peroneal EDB Motor (Ext Dig Brev)  34.5°C   Ankle    4.2 <5.5 *1.2 >3.0 B Fib Ankle 7.4 32.5 44 >40   B Fib    11.6  0.7  Poplt B Fib 1.4 9.0 64    Poplt    13.0  0.7          Left Peroneal TA Motor (AntTibialis)  34.5°C   Fib Head    2.6 <4 4.5 >4 Poplit Fib Head 2.3 10.0 43 >40   Poplit    4.9  4.0          Right Peroneal TA Motor (AntTibialis)  34.5°C   Fib Head    3.5 <4 5.7 >4 Poplit Fib Head 0.7 10.0 143 >40   Poplit    4.2  5.7          Left Tibial Motor (Abd Stuart Brev)  34.5°C   Ankle    6.0 <6 *1.0 >8 Knee Ankle 13.1 42.0 *32 >40   Knee    19.1  0.4          Right Tibial Motor (Abd Stuart Brev)  34.5°C   Ankle    5.8 <6 *2.1 >8 Knee Ankle 12.5 42.0 *34 >40   Knee    18.3  1.4          Right Ulnar Motor (Abd Dig Min)  34.5°C   Wrist    3.0 <3.1 8.6 >7 B Elbow Wrist 4.0 21.0 53 >50   B Elbow    7.0  7.6  A Elbow B Elbow 1.9 10.0 53    A Elbow    8.9  7.3                                        Electromyography     Side Muscle Nerve Root Ins Act Fibs Psw Amp Dur Poly Recrt Int Pat Comment   Right AntTibialis Dp Br Fibular L4-5 Nml Nml Nml Nml Nml 0 Nml Nml    Right Gastroc Tibial S1-2 Nml Nml Nml Nml Nml 0 Nml Nml    Right  VastusLat Femoral L2-4 Nml Nml Nml Nml Nml 0 Nml Nml    Right GluteusMed SupGluteal L5-S1 Nml Nml Nml Nml Nml 0 Nml Nml    Right VastusMed Femoral L2-4 Nml Nml Nml Nml Nml 0 Nml Nml    Right 1stDorInt Ulnar C8-T1 Nml Nml Nml Nml Nml 0 Nml Nml    Right PronatorTeres Median C6-7 Nml Nml Nml Nml Nml 0 Nml Nml    Right Biceps Musculocut C5-6 Nml Nml Nml Nml Nml 0 Nml Nml    Right Triceps Radial C6-7-8 Nml Nml Nml Nml Nml 0 Nml Nml    Right Deltoid Axillary C5-6 Nml Nml Nml Nml Nml 0 Nml Nml

## 2020-01-10 ENCOUNTER — OFFICE VISIT (OUTPATIENT)
Dept: NEUROLOGY | Facility: MEDICAL CENTER | Age: 35
End: 2020-01-10
Payer: COMMERCIAL

## 2020-01-10 VITALS
DIASTOLIC BLOOD PRESSURE: 74 MMHG | WEIGHT: 204 LBS | BODY MASS INDEX: 30.92 KG/M2 | HEIGHT: 68 IN | HEART RATE: 96 BPM | OXYGEN SATURATION: 98 % | SYSTOLIC BLOOD PRESSURE: 128 MMHG

## 2020-01-10 DIAGNOSIS — E10.42 TYPE 1 DIABETES MELLITUS WITH DIABETIC POLYNEUROPATHY (HCC): ICD-10-CM

## 2020-01-10 DIAGNOSIS — E10.42 DIABETIC POLYNEUROPATHY ASSOCIATED WITH TYPE 1 DIABETES MELLITUS (HCC): ICD-10-CM

## 2020-01-10 DIAGNOSIS — G25.81 RLS (RESTLESS LEGS SYNDROME): ICD-10-CM

## 2020-01-10 PROCEDURE — 99213 OFFICE O/P EST LOW 20 MIN: CPT | Performed by: PSYCHIATRY & NEUROLOGY

## 2020-01-10 RX ORDER — MULTIVIT WITH MINERALS/LUTEIN
1000 TABLET ORAL DAILY
COMMUNITY
End: 2022-03-28

## 2020-01-10 RX ORDER — PREGABALIN 50 MG/1
CAPSULE ORAL
Qty: 270 CAP | Refills: 3 | Status: SHIPPED | OUTPATIENT
Start: 2020-01-10 | End: 2020-03-08

## 2020-01-10 RX ORDER — GINSENG 100 MG
CAPSULE ORAL
COMMUNITY
End: 2020-07-29

## 2020-01-10 ASSESSMENT — ENCOUNTER SYMPTOMS
FALLS: 0
TINGLING: 1
FOCAL WEAKNESS: 1
TREMORS: 0
LOSS OF CONSCIOUSNESS: 0
SENSORY CHANGE: 1

## 2020-01-10 NOTE — PROGRESS NOTES
Subjective:      Nita Jackman is a 34 y.o. female who presents for follow-up, with a history of a presumed diabetes-associated polyneuropathy and symptomatic RLS.     HPI    Neuropathy: Since last seen, we had added Lyrica to her regimen, now 50 mg in the morning and 100 mg in the evening, she has actually found significant symptomatic relief.  She will occasionally get a stabbing sensation throughout the day, her work requires her to be on her feet throughout.  Into the evenings, the 2 tablets might be taken earlier depending on discomfort throughout the day.  She is tolerating the drug without issue, though there was some drowsiness initially.  Appetite and weight have been stable.    She has no weakness, there is still some loss of dexterity in her hands, the weakness having more to do with her CTS.  She denies foot drop, there is no real unsteadiness when she walks, in fact she feels better while walking with a reduction in her pain.    EMG/NCV studies done late last year on December 12, 2019 revealed a sensorimotor, length dependent polyneuropathy without evidence of any lumbosacral radiculopathy.  Her last hemoglobin A1c was 10, dating back to June, 2018!  Her TSH was also quite elevated, but she had been off her Synthroid for some time.  She has yet to be able to get in to see an endocrinologist.    RLS: Stable on her gabapentin 600 mg every evening, the addition of the Lyrica has helped the symptoms.  She denies significant augmentation or rebound.    Medical, surgical and family histories are reviewed in the electronic health record, there are no new drug allergies.  She has started the supplements I recommended including magnesium, zinc, beta-carotene, vitamin C, vitamin E, and vitamin D with calcium.  She is also on Neurontin 600 mg every evening, Lyrica 50 mg in the morning and 100 g in the evening, Synthroid, insulin, baby aspirin, Prilosec, the rest as per the electronic health record,  "noncontributory from my standpoint.    Review of Systems   Cardiovascular: Negative for leg swelling.   Musculoskeletal: Negative for falls.   Neurological: Positive for tingling, sensory change and focal weakness. Negative for tremors and loss of consciousness.   All other systems reviewed and are negative.       Objective:     /74 (BP Location: Left arm, Patient Position: Sitting, BP Cuff Size: Adult)   Pulse 96   Ht 1.727 m (5' 8\")   Wt 92.5 kg (204 lb)   SpO2 98%   BMI 31.02 kg/m²      Physical Exam    She appears in no acute distress.  Vital signs are stable.  There is no malar rash.  The neck is supple.  Cardiac evaluation is unremarkable.    Mental status exam is intact, fully oriented, there is no aphasia or inattention.  PERRLA/EOMI, visual fields are grossly full, facial movements are symmetric, the tongue and uvula midline without bulbar dysfunction.  Sensory exam is intact to temperature and light touch bilaterally.  Shoulder shrug and head rotation are intact.    Musculoskeletal exam reveals normal tone, there is no tremor or drift.  Strength is intact in all 4 extremities.  Reflexes are diffusely hypoactive, the ankle jerks are absent.  There are no pathologic reflexes.    She stands easily, her gait seems a little easier with less of an antalgic quality.  Heel, toe, and tandem walking are all done easily.  Station is normal, arm swing is symmetric.  There is no appendicular dystaxia.  Repetitive movements with the hands and feet are symmetric, slightly slowed with the fingers.  There are no asymmetries.    Sensory exam is intact to vibration, JPS and temperature.  Romberg is absent.     Assessment/Plan:     1. Diabetic polyneuropathy associated with type 1 diabetes mellitus (HCC)  Symptomatically, we will continue the Lyrica at her present dosing regimen.  Hopefully with the use of her supplements, progression symptomatically will be minimal, even better, progression of the pathology itself " slowed down.  She and I will play phone tag in the interim, we will follow-up in 6 months.    - pregabalin (LYRICA) 50 MG capsule; Take 1 tab in the AM by PO,  Take 2 tabs in in the PM.  Dispense: 270 Cap; Refill: 3    2. RLS (restless legs syndrome)  The addition of Lyrica has helped with the gabapentin 600 mg every evening.    3. Type 1 diabetes mellitus with diabetic polyneuropathy (HCC)  I am a little bit disconcerted that she has not been able to see an endocrinologist in a timely fashion, I will forward another referral and order a hemoglobin A1c as a new baseline.    - REFERRAL TO ENDOCRINOLOGY  - HEMOGLOBIN A1C; Future    Time: 20-minute spent face-to-face for exam, review, discussion, and education, of this over 50% of the time spent face-to-face counseling and coordinating care.

## 2020-03-27 ENCOUNTER — TELEPHONE (OUTPATIENT)
Dept: MEDICAL GROUP | Facility: PHYSICIAN GROUP | Age: 35
End: 2020-03-27

## 2020-03-27 NOTE — TELEPHONE ENCOUNTER
1. Caller Name: Nita                          Call Back Number: [phone] (home)         How would the patient prefer to be contacted with a response: Did not specify    Called pt, pt stated that she was able to get on a Telemedicine call this morning and has been prescribed antibiotics.

## 2020-03-27 NOTE — TELEPHONE ENCOUNTER
----- Message from Nita Jackman sent at 3/27/2020  5:25 AM PDT -----  Regarding: Non-Urgent Medical Question  Contact: [phone]  Gerardo Briceno,     I've been fighting a cold for the last 10 days with a pretty bad cough but beginning last night, I began coughing up really thick, discolored mucus. My chest hurts when I breath deep. I tried to schedule an appointment but I don't think it's wise for me to come in as I do have Diabetes and I don't want extra exposure to COVID-19. When I did try to schedule an appointment it didn't give me the option of doing a Telemedicine appointment which is what I would prefer to do. I do not have a fever by the way. If one of your MA's could help me get in or let me know what you'd like me to do I would greatly appreciate it!    Thanks, Nita

## 2020-04-07 DIAGNOSIS — E10.9 TYPE 1 DIABETES MELLITUS WITHOUT COMPLICATION (HCC): ICD-10-CM

## 2020-04-07 NOTE — TELEPHONE ENCOUNTER
Was the patient seen in the last year in this department? No     Does patient have an active prescription for medications requested? No     Received Request Via: Pharmacy      Pt met protocol?: No, last ov 7/18  Lab Results   Component Value Date/Time    HBA1C 10.0 (H) 06/22/2018 09:13 AM    HBA1C 12.2 07/12/2016 10:15 AM

## 2020-04-08 ENCOUNTER — PATIENT MESSAGE (OUTPATIENT)
Dept: MEDICAL GROUP | Facility: PHYSICIAN GROUP | Age: 35
End: 2020-04-08

## 2020-04-08 RX ORDER — INSULIN ASPART 100 [IU]/ML
5-15 INJECTION, SOLUTION INTRAVENOUS; SUBCUTANEOUS
Qty: 15 ML | Refills: 0 | Status: SHIPPED | OUTPATIENT
Start: 2020-04-08 | End: 2020-04-15 | Stop reason: SDUPTHER

## 2020-04-14 ENCOUNTER — PATIENT MESSAGE (OUTPATIENT)
Dept: MEDICAL GROUP | Facility: PHYSICIAN GROUP | Age: 35
End: 2020-04-14

## 2020-04-15 ENCOUNTER — TELEMEDICINE (OUTPATIENT)
Dept: MEDICAL GROUP | Facility: PHYSICIAN GROUP | Age: 35
End: 2020-04-15
Payer: COMMERCIAL

## 2020-04-15 ENCOUNTER — APPOINTMENT (OUTPATIENT)
Dept: MEDICAL GROUP | Facility: PHYSICIAN GROUP | Age: 35
End: 2020-04-15
Payer: COMMERCIAL

## 2020-04-15 VITALS — WEIGHT: 210 LBS | TEMPERATURE: 97.1 F | HEIGHT: 68 IN | BODY MASS INDEX: 31.83 KG/M2

## 2020-04-15 DIAGNOSIS — E03.9 ACQUIRED HYPOTHYROIDISM: ICD-10-CM

## 2020-04-15 DIAGNOSIS — E10.9 TYPE 1 DIABETES MELLITUS WITHOUT COMPLICATION (HCC): ICD-10-CM

## 2020-04-15 DIAGNOSIS — E55.9 AVITAMINOSIS D: ICD-10-CM

## 2020-04-15 DIAGNOSIS — E10.42 TYPE 1 DIABETES MELLITUS WITH DIABETIC POLYNEUROPATHY (HCC): ICD-10-CM

## 2020-04-15 DIAGNOSIS — E03.9 HYPOTHYROIDISM, UNSPECIFIED TYPE: ICD-10-CM

## 2020-04-15 PROCEDURE — 99213 OFFICE O/P EST LOW 20 MIN: CPT | Mod: 95,CR | Performed by: NURSE PRACTITIONER

## 2020-04-15 RX ORDER — NORETHINDRONE, ETHINYL ESTRADIOL, AND FERROUS FUMARATE 0.8-25(24)
1 KIT ORAL DAILY
COMMUNITY
End: 2022-03-28

## 2020-04-15 RX ORDER — PREGABALIN 50 MG/1
50 CAPSULE ORAL EVERY EVENING
COMMUNITY
Start: 2020-04-14 | End: 2020-08-28 | Stop reason: SDUPTHER

## 2020-04-15 RX ORDER — DOXYCYCLINE 100 MG/1
CAPSULE ORAL
COMMUNITY
Start: 2020-03-27 | End: 2020-04-15

## 2020-04-15 RX ORDER — FLIBANSERIN 100 MG/1
1 TABLET, FILM COATED ORAL
COMMUNITY
Start: 2020-01-22 | End: 2020-04-15

## 2020-04-15 RX ORDER — INSULIN ASPART 100 [IU]/ML
20 INJECTION, SOLUTION INTRAVENOUS; SUBCUTANEOUS
Qty: 6 PEN | Refills: 11 | Status: SHIPPED | OUTPATIENT
Start: 2020-04-15 | End: 2020-07-29

## 2020-04-15 SDOH — HEALTH STABILITY: MENTAL HEALTH: HOW OFTEN DO YOU HAVE 6 OR MORE DRINKS ON ONE OCCASION?: WEEKLY

## 2020-04-15 NOTE — PROGRESS NOTES
Chief Complaint   Patient presents with   • Telephone Visit     Medication Management/Rx Refills/Diabetes     This encounter was conducted via Zoom .   Verbal consent was obtained. Patient's identity was verified.    HISTORY OF PRESENT ILLNESS: Patient is a 34 y.o. female established patient who presents today to discuss the following issues:    Type 1 diabetes mellitus with neurological manifestations (HCC)  Chronic in nature.  Stable on meds.  Due for labs and refills.      Acquired hypothyroidism  Chronic in nature.  Stable on meds.  Due for labs and refills.        Patient Active Problem List    Diagnosis Date Noted   • Acquired hypothyroidism 11/09/2019   • RLS (restless legs syndrome) 10/22/2019   • Diabetic neuropathy (HCC) 07/31/2018   • BMI 32.0-32.9,adult 10/05/2016   • Type 1 diabetes mellitus with neurological manifestations (HCC) 07/12/2016   • Depression 07/12/2016       Allergies:Percocet [oxycodone-acetaminophen] and Vicodin [hydrocodone-acetaminophen]    Current Outpatient Medications   Medication Sig Dispense Refill   • LAYOLIS FE 0.8-25 MG-MCG Chew Tab CHEW AND SWALLOW 1 T PO QD     • pregabalin (LYRICA) 50 MG capsule      • NOVOLOG, insulin aspart, (NOVOLOG FLEXPEN) 100 UNIT/ML injection PEN Inject 20 Units as instructed 3 times a day before meals. 6 PEN 11   • Blood Glucose Meter Kit Test blood sugar as recommended by provider. 1 Kit 0   • Blood Glucose Test Strips Test strips order: Test strips for INSURANCE APPROVED meter. Sig: use 4x per day and prn ssx high or low sugar 360 Each 11   • aspirin EC (ECOTRIN) 81 MG Tablet Delayed Response Take 81 mg by mouth every day.     • Ascorbic Acid (VITAMIN C) 1000 MG Tab Take  by mouth.     • cholecalciferol (VITAMIN D3) 400 UNIT Tab Take 400 Units by mouth every day.     • MAGNESIUM PO Take 5 mg by mouth every day.     • Zinc 50 MG Tab Take  by mouth.     • levothyroxine (SYNTHROID) 125 MCG Tab Take 1 Tab by mouth every morning. ON A EMPTY STOMACH 90  Tab 3   • fluticasone (FLOVENT HFA) 110 MCG/ACT Aerosol Inhale 2 Puffs by mouth 2 times a day. 1 Inhaler 0   • gabapentin (NEURONTIN) 600 MG tablet Take 1 Tab by mouth every evening. 90 Tab 3   • omeprazole (PRILOSEC) 40 MG delayed-release capsule Take 40 mg by mouth every day.     • Insulin Degludec (TRESIBA FLEXTOUCH) 100 UNIT/ML Solution Pen-injector Inject 40 Units as instructed every bedtime. 4 PEN 11   • Blood Glucose Test Strips Use one strip to test blood sugar three times daily. 300 Strip 3   • Lancets Use one lancet to test blood sugar three times daily. 300 Each 3   • albuterol 108 (90 Base) MCG/ACT Aero Soln inhalation aerosol Inhale 2 Puffs by mouth every four hours as needed for Shortness of Breath. 1 Inhaler 3   • Blood Glucose Monitoring Suppl Supplies Misc Test strips order: Test strips for Abbott Freestyle Lite meter. Sig: use 3 times a day and prn ssx high or low sugar #300 RF x 3 300 Strip 3   • cetirizine (ZYRTEC) 10 MG TABS Take 10 mg by mouth every day.       No current facility-administered medications for this visit.        Social History     Tobacco Use   • Smoking status: Never Smoker   • Smokeless tobacco: Never Used   Substance Use Topics   • Alcohol use: Yes     Alcohol/week: 1.2 - 1.8 oz     Types: 2 - 3 Glasses of wine per week     Binge frequency: Weekly   • Drug use: No       Family Status   Relation Name Status   • Mo  Alive   • Fa  Alive   • OTHER  (Not Specified)   • OTHER  (Not Specified)   • OTHER  (Not Specified)     Family History   Problem Relation Age of Onset   • Hyperlipidemia Father    • Diabetes Other    • Hypertension Other    • Stroke Other        Review of Systems:   Constitutional: Negative for fever, chills, weight loss and malaise/fatigue.   HENT: Negative for ear pain, nosebleeds, congestion, sore throat and neck pain.    Eyes: Negative for blurred vision.   Respiratory: Negative for cough, sputum production, shortness of breath and wheezing.    Cardiovascular:  "Negative for chest pain, palpitations, orthopnea and leg swelling.   Gastrointestinal: Negative for heartburn, nausea, vomiting and abdominal pain.   Genitourinary: Negative for dysuria, urgency and frequency.   Musculoskeletal: Negative for myalgias, joint pain, and back pain.  Skin: Negative for rash and itching.   Neurological: Negative for dizziness, tingling, tremors, sensory change, focal weakness and headaches.   Endo/Heme/Allergies: Does not bruise/bleed easily.   Psychiatric/Behavioral: Negative for depression, suicidal ideas and memory loss.  The patient is not nervous/anxious and does not have insomnia.    All other systems reviewed and are negative except as in HPI.    Exam:  Temperature 36.2 °C (97.1 °F) (Oral)   Height 1.727 m (5' 8\")   Weight 95.3 kg (210 lb)   General:  Well nourished, well developed female in NAD  Head: Grossly normal.  Neck: Supple  Pulmonary: Normal effort.   Extremities: No clubbing, cyanosis, or edema.  Skin: Intact with no obvious rashes or lesions.  Neuro: Grossly intact.  Psych: Alert and oriented x 3.  Mood and affect appropriate.    Medical decision-making and discussion: Nita is here today to discuss a few things. Her chart was reviewed, lab work was ordered, her prescriptions were refilled, and she will follow-up here as needed.          Assessment/Plan:  1. Type 1 diabetes mellitus without complication (HCC)  NOVOLOG, insulin aspart, (NOVOLOG FLEXPEN) 100 UNIT/ML injection PEN    CBC WITH DIFFERENTIAL    Comp Metabolic Panel    Lipid Profile    HEMOGLOBIN A1C    MICROALBUMIN CREAT RATIO URINE    Blood Glucose Meter Kit    Blood Glucose Test Strips   2. Hypothyroidism, unspecified type  TSH    TRIIDOTHYRONINE    FREE THYROXINE   3. Avitaminosis D  VITAMIN D,25 HYDROXY   4. Type 1 diabetes mellitus with diabetic polyneuropathy (HCC)     5. Acquired hypothyroidism         Return if symptoms worsen or fail to improve.    Please note that this dictation was created using " voice recognition software. I have made every reasonable attempt to correct obvious errors, but I expect that there are errors of grammar and possibly content that I did not discover before finalizing the note.

## 2020-05-05 ENCOUNTER — TELEMEDICINE (OUTPATIENT)
Dept: MEDICAL GROUP | Facility: PHYSICIAN GROUP | Age: 35
End: 2020-05-05
Payer: COMMERCIAL

## 2020-05-05 VITALS — WEIGHT: 210 LBS | HEIGHT: 68 IN | BODY MASS INDEX: 31.83 KG/M2 | TEMPERATURE: 98.3 F

## 2020-05-05 DIAGNOSIS — R21 RASH: ICD-10-CM

## 2020-05-05 PROCEDURE — 99213 OFFICE O/P EST LOW 20 MIN: CPT | Mod: 95,CR | Performed by: NURSE PRACTITIONER

## 2020-05-05 RX ORDER — BLOOD-GLUCOSE METER
EACH MISCELLANEOUS
COMMUNITY
Start: 2020-04-15 | End: 2020-07-29

## 2020-05-05 RX ORDER — BLOOD SUGAR DIAGNOSTIC
STRIP MISCELLANEOUS
COMMUNITY
Start: 2020-04-15 | End: 2020-07-29

## 2020-05-05 RX ORDER — TRIAMCINOLONE ACETONIDE 1 MG/G
1 CREAM TOPICAL 2 TIMES DAILY
Qty: 1 TUBE | Refills: 0 | Status: SHIPPED | OUTPATIENT
Start: 2020-05-05 | End: 2020-07-29

## 2020-05-05 NOTE — PROGRESS NOTES
Chief Complaint   Patient presents with   • Telephone Visit     ZOOM VISIT/RASH,ELBOW BILATERAL/X 3 WEEKS     This encounter was conducted via Zoom .   Verbal consent was obtained. Patient's identity was verified.    HISTORY OF PRESENT ILLNESS: Patient is a 35 y.o. female established patient who presents today to discuss the following issues:    Rash  Started about 3 weeks ago with itchy bumps on her elbows.  Does not have a rash anywhere else.  Denies new medications or products, and no one else in her home has any symptoms.  Discussed options, and she would like to proceed with a trial of triamcinolone cream.       Patient Active Problem List    Diagnosis Date Noted   • Rash 05/05/2020   • Acquired hypothyroidism 11/09/2019   • RLS (restless legs syndrome) 10/22/2019   • Diabetic neuropathy ([facility]) 07/31/2018   • BMI 32.0-32.9,adult 10/05/2016   • Type 1 diabetes mellitus with neurological manifestations ([facility]) 07/12/2016   • Depression 07/12/2016       Allergies:Percocet [oxycodone-acetaminophen] and Vicodin [hydrocodone-acetaminophen]    Current Outpatient Medications   Medication Sig Dispense Refill   • ONETOUCH VERIO strip USE TO TEST BLOOD SUGAR QID AND PRN SIGNS AND SYMPTOMS HIGH OR LOW BLOOD SUGAR     • Blood Glucose Monitoring Suppl (ONETOUCH VERIO) w/Device Kit USE TO TEST BLOOD SUGAR UTD     • triamcinolone acetonide (KENALOG) 0.1 % Cream Apply 1 Application to affected area(s) 2 times a day. 1 Tube 0   • LAYOLIS FE 0.8-25 MG-MCG Chew Tab CHEW AND SWALLOW 1 T PO QD     • pregabalin (LYRICA) 50 MG capsule Take 50 mg by mouth 2 times a day. 1 tab AM and 2 tabs at bedtime     • NOVOLOG, insulin aspart, (NOVOLOG FLEXPEN) 100 UNIT/ML injection PEN Inject 20 Units as instructed 3 times a day before meals. 6 PEN 11   • Blood Glucose Meter Kit Test blood sugar as recommended by provider. 1 Kit 0   • Blood Glucose Test Strips Test strips order: Test strips for INSURANCE APPROVED meter. Sig: use 4x per day and prn  ssx high or low sugar 360 Each 11   • aspirin EC (ECOTRIN) 81 MG Tablet Delayed Response Take 81 mg by mouth every day.     • Ascorbic Acid (VITAMIN C) 1000 MG Tab Take  by mouth every day.     • cholecalciferol (VITAMIN D3) 400 UNIT Tab Take 400 Units by mouth every day.     • MAGNESIUM PO Take 5 mg by mouth every day.     • Zinc 50 MG Tab Take  by mouth.     • levothyroxine (SYNTHROID) 125 MCG Tab Take 1 Tab by mouth every morning. ON A EMPTY STOMACH 90 Tab 3   • gabapentin (NEURONTIN) 600 MG tablet Take 1 Tab by mouth every evening. 90 Tab 3   • omeprazole (PRILOSEC) 40 MG delayed-release capsule Take 40 mg by mouth every day.     • Insulin Degludec (TRESIBA FLEXTOUCH) 100 UNIT/ML Solution Pen-injector Inject 40 Units as instructed every bedtime. 4 PEN 11   • Lancets Use one lancet to test blood sugar three times daily. 300 Each 3   • albuterol 108 (90 Base) MCG/ACT Aero Soln inhalation aerosol Inhale 2 Puffs by mouth every four hours as needed for Shortness of Breath. 1 Inhaler 3   • cetirizine (ZYRTEC) 10 MG TABS Take 10 mg by mouth every day.       No current facility-administered medications for this visit.        Social History     Tobacco Use   • Smoking status: Never Smoker   • Smokeless tobacco: Never Used   Substance Use Topics   • Alcohol use: Yes     Alcohol/week: 1.2 - 1.8 oz     Types: 2 - 3 Glasses of wine per week     Binge frequency: Weekly   • Drug use: No       Family Status   Relation Name Status   • Mo  Alive   • Fa  Alive   • OTHER  (Not Specified)   • OTHER  (Not Specified)   • OTHER  (Not Specified)     Family History   Problem Relation Age of Onset   • Hyperlipidemia Father    • Diabetes Other    • Hypertension Other    • Stroke Other        Review of Systems:   Constitutional: Negative for fever, chills, weight loss and malaise/fatigue.   HENT: Negative for ear pain, nosebleeds, congestion, sore throat and neck pain.    Eyes: Negative for blurred vision.   Respiratory: Negative for cough,  "sputum production, shortness of breath and wheezing.    Cardiovascular: Negative for chest pain, palpitations, orthopnea and leg swelling.   Gastrointestinal: Negative for heartburn, nausea, vomiting and abdominal pain.   Genitourinary: Negative for dysuria, urgency and frequency.   Musculoskeletal: Negative for myalgias, joint pain, and back pain.  Skin: Positive for bumpy rash and itching on both elbows.   Neurological: Negative for dizziness, tingling, tremors, sensory change, focal weakness and headaches.   Endo/Heme/Allergies: Does not bruise/bleed easily.   Psychiatric/Behavioral: Negative for depression, suicidal ideas and memory loss.  The patient is not nervous/anxious and does not have insomnia.    All other systems reviewed and are negative except as in HPI.    Exam:  Temperature 36.8 °C (98.3 °F) (Oral)   Height 1.727 m (5' 8\")   Weight 95.3 kg (210 lb)   General:  Well nourished, well developed female in NAD  Head: Grossly normal.  Neck: Supple.  Pulmonary: Normal effort  Extremities: No clubbing, cyanosis, or edema.  Skin: Unable to see rash via Zoom.  Neuro: Grossly intact.  Psych: Alert and oriented x 3.  Mood and affect appropriate.    Medical decision-making and discussion: Nita is here today to discuss symptoms.  Her chart was reviewed, medication was sent to her pharmacy, and she will follow-up here as needed.          Assessment/Plan:  1. Rash  triamcinolone acetonide (KENALOG) 0.1 % Cream       Return if symptoms worsen or fail to improve.    Please note that this dictation was created using voice recognition software. I have made every reasonable attempt to correct obvious errors, but I expect that there are errors of grammar and possibly content that I did not discover before finalizing the note.  "

## 2020-07-06 DIAGNOSIS — E10.9 TYPE 1 DIABETES MELLITUS WITHOUT COMPLICATION (HCC): ICD-10-CM

## 2020-07-07 ENCOUNTER — OFFICE VISIT (OUTPATIENT)
Dept: MEDICAL GROUP | Facility: PHYSICIAN GROUP | Age: 35
End: 2020-07-07
Payer: COMMERCIAL

## 2020-07-07 VITALS
TEMPERATURE: 97.3 F | DIASTOLIC BLOOD PRESSURE: 80 MMHG | SYSTOLIC BLOOD PRESSURE: 140 MMHG | RESPIRATION RATE: 16 BRPM | OXYGEN SATURATION: 98 % | HEART RATE: 96 BPM | HEIGHT: 68 IN | WEIGHT: 208 LBS | BODY MASS INDEX: 31.52 KG/M2

## 2020-07-07 DIAGNOSIS — E03.9 ACQUIRED HYPOTHYROIDISM: ICD-10-CM

## 2020-07-07 DIAGNOSIS — F41.9 ANXIETY AND DEPRESSION: ICD-10-CM

## 2020-07-07 DIAGNOSIS — E10.42 TYPE 1 DIABETES MELLITUS WITH DIABETIC POLYNEUROPATHY (HCC): ICD-10-CM

## 2020-07-07 DIAGNOSIS — F32.A ANXIETY AND DEPRESSION: ICD-10-CM

## 2020-07-07 PROCEDURE — 99204 OFFICE O/P NEW MOD 45 MIN: CPT | Performed by: INTERNAL MEDICINE

## 2020-07-07 ASSESSMENT — PATIENT HEALTH QUESTIONNAIRE - PHQ9
8. MOVING OR SPEAKING SO SLOWLY THAT OTHER PEOPLE COULD HAVE NOTICED. OR THE OPPOSITE, BEING SO FIGETY OR RESTLESS THAT YOU HAVE BEEN MOVING AROUND A LOT MORE THAN USUAL: NOT AT ALL
2. FEELING DOWN, DEPRESSED, IRRITABLE, OR HOPELESS: MORE THAN HALF THE DAYS
5. POOR APPETITE OR OVEREATING: NEARLY EVERY DAY
3. TROUBLE FALLING OR STAYING ASLEEP OR SLEEPING TOO MUCH: MORE THAN HALF THE DAYS
4. FEELING TIRED OR HAVING LITTLE ENERGY: NEARLY EVERY DAY
9. THOUGHTS THAT YOU WOULD BE BETTER OFF DEAD, OR OF HURTING YOURSELF: NOT AT ALL
6. FEELING BAD ABOUT YOURSELF - OR THAT YOU ARE A FAILURE OR HAVE LET YOURSELF OR YOUR FAMILY DOWN: MORE THAN HALF THE DAYS
1. LITTLE INTEREST OR PLEASURE IN DOING THINGS: MORE THAN HALF THE DAYS
SUM OF ALL RESPONSES TO PHQ9 QUESTIONS 1 AND 2: 4
SUM OF ALL RESPONSES TO PHQ QUESTIONS 1-9: 17
7. TROUBLE CONCENTRATING ON THINGS, SUCH AS READING THE NEWSPAPER OR WATCHING TELEVISION: NEARLY EVERY DAY

## 2020-07-07 NOTE — PROGRESS NOTES
CC: Depression and anxiety  Requesting insulin needle prescription      HPI: PCP is not in the office     35 y.o. 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  The patient is here today requesting insulin needles.  sHe is currently using Tresiba.  The patient denies hypoglycemic symptoms  Patient has not done lab she has the order for these    Anxiety and depression  This has been a chronic condition noted since last several months.  Patient reported recurrent anxiety alternating with depression  Patient denies suicidal ideation.  Patient stated that she was on Prozac at one time  No previous history of bipolar disorder    Acquired hypothyroidism  Chronic condition patient is currently taking levothyroxine.  Patient is due for lab test.  She has the order for the lab          REVIEW OF SYSTEMS:     Constitutional:  no fever / chills   Neurologic: no headaches, no numbness/tingling  Eyes: no changes in vision  ENT: no sore throat, no hearing loss  CV:  no chest pain, no palpitations  Pulmonary: no SOB, no cough    GI: no nausea / vomiting, no diarrhea, no constipation  :  no dysuria, no hematuria   Skin: no rash  Hematologic: no bleeding      Allergies: Percocet [oxycodone-acetaminophen] and Vicodin [hydrocodone-acetaminophen]    Current Outpatient Medications Ordered in Epic   Medication Sig Dispense Refill   • Insulin Pen Needle 32 G x 4 mm Use as directed 200 Each 11   • ONETOUCH VERIO strip USE TO TEST BLOOD SUGAR QID AND PRN SIGNS AND SYMPTOMS HIGH OR LOW BLOOD SUGAR     • Blood Glucose Monitoring Suppl (ONETOUCH VERIO) w/Device Kit USE TO TEST BLOOD SUGAR UTD     • triamcinolone acetonide (KENALOG) 0.1 % Cream Apply 1 Application to affected area(s) 2 times a day. 1 Tube 0   • LAYOLIS FE 0.8-25 MG-MCG Chew Tab CHEW AND SWALLOW 1 T PO QD     • pregabalin (LYRICA) 50 MG capsule Take 50 mg by mouth 2 times a day. 1 tab AM and  2 tabs at bedtime     • NOVOLOG, insulin aspart, (NOVOLOG FLEXPEN) 100 UNIT/ML injection PEN Inject 20 Units as instructed 3 times a day before meals. 6 PEN 11   • Blood Glucose Meter Kit Test blood sugar as recommended by provider. 1 Kit 0   • Blood Glucose Test Strips Test strips order: Test strips for INSURANCE APPROVED meter. Sig: use 4x per day and prn ssx high or low sugar 360 Each 11   • aspirin EC (ECOTRIN) 81 MG Tablet Delayed Response Take 81 mg by mouth every day.     • Ascorbic Acid (VITAMIN C) 1000 MG Tab Take  by mouth every day.     • cholecalciferol (VITAMIN D3) 400 UNIT Tab Take 400 Units by mouth every day.     • MAGNESIUM PO Take 5 mg by mouth every day.     • Zinc 50 MG Tab Take  by mouth.     • levothyroxine (SYNTHROID) 125 MCG Tab Take 1 Tab by mouth every morning. ON A EMPTY STOMACH 90 Tab 3   • gabapentin (NEURONTIN) 600 MG tablet Take 1 Tab by mouth every evening. 90 Tab 3   • omeprazole (PRILOSEC) 40 MG delayed-release capsule Take 40 mg by mouth every day.     • Insulin Degludec (TRESIBA FLEXTOUCH) 100 UNIT/ML Solution Pen-injector Inject 40 Units as instructed every bedtime. 4 PEN 11   • Lancets Use one lancet to test blood sugar three times daily. 300 Each 3   • albuterol 108 (90 Base) MCG/ACT Aero Soln inhalation aerosol Inhale 2 Puffs by mouth every four hours as needed for Shortness of Breath. 1 Inhaler 3   • cetirizine (ZYRTEC) 10 MG TABS Take 10 mg by mouth every day.       No current [facility]-ordered facility-administered medications on file.        Past Medical History:   Diagnosis Date   • Asthma    • Diabetes    • Retinopathy due to secondary diabetes (HCC)    • Thyroid disorder         Past Surgical History:   Procedure Laterality Date   • CARPAL TUNNEL ENDOSCOPIC  5/24/2011    Performed by WAYLON COLEMAN at SURGERY [facility] ORS   • VITRECTOMY POSTERIOR  5/4/2009    Performed by DARYL DASILVA at SURGERY SAME DAY [facility] ORS   • EYE CRYOSURGERY  5/4/2009    Performed by  DARYL DASILVA at SURGERY SAME DAY [facility] ORS   • RETINAL DETACHMENT REPAIR  7/11/2008    left/right eye        Family History   Problem Relation Age of Onset   • Hyperlipidemia Father    • Diabetes Other    • Hypertension Other    • Stroke Other         Social History     Tobacco Use   Smoking Status Never Smoker   Smokeless Tobacco Never Used          Social History     Substance and Sexual Activity   Alcohol Use Yes   • Alcohol/week: 1.2 - 1.8 oz   • Types: 2 - 3 Glasses of wine per week   • Binge frequency: Weekly        ---------------------------------------------------------------------     PHYSICAL EXAM:   Vitals:    07/07/20 1559   BP: 140/80   Pulse: 96   Resp: 16   Temp: 36.3 °C (97.3 °F)   SpO2: 98%      Body mass index is 31.63 kg/m².        Constitutional: no acute distress  Eyes: PERRL, EOMI  Ears/nose/mouth: OP no exudates  Neck: supple, no JVD  CV: heart RRR  Resp: normal effort, no wheezing or rales.  GI: abdomen soft, no obvious mass, no tenderness  Neuro: CN 2-12 grossly intact  Skin: no obvious rash noted        ---------------------------------------------------------------------     ASSESSMENT and PLAN:  1. Anxiety and depression  Chronic condition, uncontrolled.  I am concerned the patient may have possible bipolar disorder strongly recommend referral to psychiatry for further evaluation.  Patient is in agreement with the plan.  - REFERRAL TO PSYCHIATRY    2. Type 1 diabetes mellitus with diabetic polyneuropathy (HCC)  Chronic condition.  Current control is unclear.  Lab tests ordered.    3. Acquired hypothyroidism  Chronic condition  Control is unclear.  Advised the patient to get lab work done soon as possible.         Recommend to follow-up with her PCP in about 3 to 4 months.    PATIENT EDUCATION:  -If any problems should arise, patient was advised to contact our office or go to ER to be evaluated.  -Advised pt to follow a healthy diet and regular aerobic exercise regimen. Advised  pt to avoid alcohol and tobacco use.    Please note that this dictation was created using voice recognition software. I have made every reasonable attempt to correct obvious errors, but it is possible there are errors of grammar and possibly content that I did not discover before finalizing the note.

## 2020-07-07 NOTE — ASSESSMENT & PLAN NOTE
chronic condition.  The patient is here today requesting insulin needles.  sHe is currently using Tresiba.  The patient denies hypoglycemic symptoms  Patient has not done lab she has the order for these

## 2020-07-07 NOTE — ASSESSMENT & PLAN NOTE
This has been a chronic condition noted since last several months.  Patient reported recurrent anxiety alternating with depression  Patient denies suicidal ideation.  Patient stated that she was on Prozac at one time  No previous history of bipolar disorder

## 2020-07-07 NOTE — ASSESSMENT & PLAN NOTE
Chronic condition patient is currently taking levothyroxine.  Patient is due for lab test.  She has the order for the lab

## 2020-07-08 RX ORDER — INSULIN DEGLUDEC 100 U/ML
INJECTION, SOLUTION SUBCUTANEOUS
Qty: 12 ML | Refills: 11 | Status: SHIPPED | OUTPATIENT
Start: 2020-07-08 | End: 2022-03-28

## 2020-07-15 ENCOUNTER — TELEPHONE (OUTPATIENT)
Dept: MEDICAL GROUP | Facility: PHYSICIAN GROUP | Age: 35
End: 2020-07-15

## 2020-07-15 NOTE — TELEPHONE ENCOUNTER
DOCUMENTATION OF PAR STATUS:    1. Name of Medication & Dose: Novolog flexpen     2. Name of Prescription Coverage Company & phone #: [employer]    3. Date Prior Auth Submitted: 07/15/2020     4. What information was given to obtain insurance decision? Form faxed and scanned into media    5. Prior Auth Status? Pending    6. Patient Notified: yes

## 2020-07-29 ENCOUNTER — APPOINTMENT (OUTPATIENT)
Dept: RADIOLOGY | Facility: MEDICAL CENTER | Age: 35
End: 2020-07-29
Attending: INTERNAL MEDICINE
Payer: COMMERCIAL

## 2020-07-29 ENCOUNTER — ANESTHESIA (OUTPATIENT)
Dept: SURGERY | Facility: MEDICAL CENTER | Age: 35
End: 2020-07-29
Payer: COMMERCIAL

## 2020-07-29 ENCOUNTER — ANESTHESIA EVENT (OUTPATIENT)
Dept: SURGERY | Facility: MEDICAL CENTER | Age: 35
End: 2020-07-29
Payer: COMMERCIAL

## 2020-07-29 ENCOUNTER — HOSPITAL ENCOUNTER (OUTPATIENT)
Facility: MEDICAL CENTER | Age: 35
End: 2020-07-30
Attending: EMERGENCY MEDICINE | Admitting: SURGERY
Payer: COMMERCIAL

## 2020-07-29 DIAGNOSIS — R10.31 ABDOMINAL PAIN, RIGHT LOWER QUADRANT: ICD-10-CM

## 2020-07-29 DIAGNOSIS — K35.890 OTHER ACUTE APPENDICITIS: ICD-10-CM

## 2020-07-29 DIAGNOSIS — G89.18 POSTOPERATIVE PAIN: ICD-10-CM

## 2020-07-29 PROBLEM — J45.20 MILD INTERMITTENT ASTHMA WITHOUT COMPLICATION: Status: ACTIVE | Noted: 2020-07-29

## 2020-07-29 LAB
ANION GAP SERPL CALC-SCNC: 12 MMOL/L (ref 7–16)
BASOPHILS # BLD AUTO: 0.5 % (ref 0–1.8)
BASOPHILS # BLD: 0.04 K/UL (ref 0–0.12)
BUN SERPL-MCNC: 10 MG/DL (ref 8–22)
CALCIUM SERPL-MCNC: 9.4 MG/DL (ref 8.5–10.5)
CHLORIDE SERPL-SCNC: 100 MMOL/L (ref 96–112)
CO2 SERPL-SCNC: 25 MMOL/L (ref 20–33)
COVID ORDER STATUS COVID19: NORMAL
CREAT SERPL-MCNC: 0.81 MG/DL (ref 0.5–1.4)
EOSINOPHIL # BLD AUTO: 0.29 K/UL (ref 0–0.51)
EOSINOPHIL NFR BLD: 3.4 % (ref 0–6.9)
ERYTHROCYTE [DISTWIDTH] IN BLOOD BY AUTOMATED COUNT: 44.8 FL (ref 35.9–50)
GLUCOSE BLD-MCNC: 98 MG/DL (ref 65–99)
GLUCOSE SERPL-MCNC: 142 MG/DL (ref 65–99)
HCG SERPL QL: NEGATIVE
HCT VFR BLD AUTO: 41.9 % (ref 37–47)
HGB BLD-MCNC: 13.1 G/DL (ref 12–16)
IMM GRANULOCYTES # BLD AUTO: 0.04 K/UL (ref 0–0.11)
IMM GRANULOCYTES NFR BLD AUTO: 0.5 % (ref 0–0.9)
INR PPP: 0.93 (ref 0.87–1.13)
LIPASE SERPL-CCNC: 11 U/L (ref 11–82)
LYMPHOCYTES # BLD AUTO: 3.38 K/UL (ref 1–4.8)
LYMPHOCYTES NFR BLD: 39.1 % (ref 22–41)
MCH RBC QN AUTO: 26.7 PG (ref 27–33)
MCHC RBC AUTO-ENTMCNC: 31.3 G/DL (ref 33.6–35)
MCV RBC AUTO: 85.5 FL (ref 81.4–97.8)
MONOCYTES # BLD AUTO: 0.44 K/UL (ref 0–0.85)
MONOCYTES NFR BLD AUTO: 5.1 % (ref 0–13.4)
NEUTROPHILS # BLD AUTO: 4.46 K/UL (ref 2–7.15)
NEUTROPHILS NFR BLD: 51.4 % (ref 44–72)
NRBC # BLD AUTO: 0 K/UL
NRBC BLD-RTO: 0 /100 WBC
PLATELET # BLD AUTO: 330 K/UL (ref 164–446)
PMV BLD AUTO: 10 FL (ref 9–12.9)
POTASSIUM SERPL-SCNC: 3.9 MMOL/L (ref 3.6–5.5)
PROTHROMBIN TIME: 12.8 SEC (ref 12–14.6)
RBC # BLD AUTO: 4.9 M/UL (ref 4.2–5.4)
SARS-COV-2 RDRP RESP QL NAA+PROBE: NOTDETECTED
SODIUM SERPL-SCNC: 137 MMOL/L (ref 135–145)
SPECIMEN SOURCE: NORMAL
WBC # BLD AUTO: 8.7 K/UL (ref 4.8–10.8)

## 2020-07-29 PROCEDURE — 501568 HCHG TROCAR, BLUNTPORT 12MM: Performed by: SURGERY

## 2020-07-29 PROCEDURE — 501838 HCHG SUTURE GENERAL: Performed by: SURGERY

## 2020-07-29 PROCEDURE — 160048 HCHG OR STATISTICAL LEVEL 1-5: Performed by: SURGERY

## 2020-07-29 PROCEDURE — 700105 HCHG RX REV CODE 258: Performed by: ANESTHESIOLOGY

## 2020-07-29 PROCEDURE — 160029 HCHG SURGERY MINUTES - 1ST 30 MINS LEVEL 4: Performed by: SURGERY

## 2020-07-29 PROCEDURE — 700102 HCHG RX REV CODE 250 W/ 637 OVERRIDE(OP): Performed by: ANESTHESIOLOGY

## 2020-07-29 PROCEDURE — 82962 GLUCOSE BLOOD TEST: CPT

## 2020-07-29 PROCEDURE — 160036 HCHG PACU - EA ADDL 30 MINS PHASE I: Performed by: SURGERY

## 2020-07-29 PROCEDURE — A9270 NON-COVERED ITEM OR SERVICE: HCPCS | Performed by: ANESTHESIOLOGY

## 2020-07-29 PROCEDURE — 502704 HCHG DEVICE, LIGASURE IMPACT: Performed by: SURGERY

## 2020-07-29 PROCEDURE — 80048 BASIC METABOLIC PNL TOTAL CA: CPT

## 2020-07-29 PROCEDURE — 96367 TX/PROPH/DG ADDL SEQ IV INF: CPT

## 2020-07-29 PROCEDURE — C9803 HOPD COVID-19 SPEC COLLECT: HCPCS | Performed by: EMERGENCY MEDICINE

## 2020-07-29 PROCEDURE — 88304 TISSUE EXAM BY PATHOLOGIST: CPT

## 2020-07-29 PROCEDURE — 502571 HCHG PACK, LAP CHOLE: Performed by: SURGERY

## 2020-07-29 PROCEDURE — 700101 HCHG RX REV CODE 250: Performed by: EMERGENCY MEDICINE

## 2020-07-29 PROCEDURE — 700105 HCHG RX REV CODE 258: Performed by: EMERGENCY MEDICINE

## 2020-07-29 PROCEDURE — G0378 HOSPITAL OBSERVATION PER HR: HCPCS

## 2020-07-29 PROCEDURE — 700101 HCHG RX REV CODE 250: Performed by: ANESTHESIOLOGY

## 2020-07-29 PROCEDURE — 160035 HCHG PACU - 1ST 60 MINS PHASE I: Performed by: SURGERY

## 2020-07-29 PROCEDURE — 700111 HCHG RX REV CODE 636 W/ 250 OVERRIDE (IP): Performed by: ANESTHESIOLOGY

## 2020-07-29 PROCEDURE — 700111 HCHG RX REV CODE 636 W/ 250 OVERRIDE (IP): Performed by: EMERGENCY MEDICINE

## 2020-07-29 PROCEDURE — 99291 CRITICAL CARE FIRST HOUR: CPT

## 2020-07-29 PROCEDURE — 84703 CHORIONIC GONADOTROPIN ASSAY: CPT

## 2020-07-29 PROCEDURE — 83690 ASSAY OF LIPASE: CPT

## 2020-07-29 PROCEDURE — 160009 HCHG ANES TIME/MIN: Performed by: SURGERY

## 2020-07-29 PROCEDURE — 501583 HCHG TROCAR, THRD CAN&SEAL 5X100: Performed by: SURGERY

## 2020-07-29 PROCEDURE — 700101 HCHG RX REV CODE 250: Performed by: SURGERY

## 2020-07-29 PROCEDURE — 85610 PROTHROMBIN TIME: CPT

## 2020-07-29 PROCEDURE — 160041 HCHG SURGERY MINUTES - EA ADDL 1 MIN LEVEL 4: Performed by: SURGERY

## 2020-07-29 PROCEDURE — 501570 HCHG TROCAR, SEPARATOR: Performed by: SURGERY

## 2020-07-29 PROCEDURE — 85025 COMPLETE CBC W/AUTO DIFF WBC: CPT

## 2020-07-29 PROCEDURE — U0004 COV-19 TEST NON-CDC HGH THRU: HCPCS

## 2020-07-29 PROCEDURE — 96365 THER/PROPH/DIAG IV INF INIT: CPT

## 2020-07-29 PROCEDURE — 501399 HCHG SPECIMAN BAG, ENDO CATC: Performed by: SURGERY

## 2020-07-29 PROCEDURE — 76705 ECHO EXAM OF ABDOMEN: CPT

## 2020-07-29 PROCEDURE — 160002 HCHG RECOVERY MINUTES (STAT): Performed by: SURGERY

## 2020-07-29 RX ORDER — TRAMADOL HYDROCHLORIDE 50 MG/1
50 TABLET ORAL
Status: COMPLETED | OUTPATIENT
Start: 2020-07-29 | End: 2020-07-29

## 2020-07-29 RX ORDER — BUPIVACAINE HYDROCHLORIDE AND EPINEPHRINE 5; 5 MG/ML; UG/ML
INJECTION, SOLUTION EPIDURAL; INTRACAUDAL; PERINEURAL
Status: DISCONTINUED | OUTPATIENT
Start: 2020-07-29 | End: 2020-07-29 | Stop reason: HOSPADM

## 2020-07-29 RX ORDER — HYDROMORPHONE HYDROCHLORIDE 1 MG/ML
0.2 INJECTION, SOLUTION INTRAMUSCULAR; INTRAVENOUS; SUBCUTANEOUS
Status: DISCONTINUED | OUTPATIENT
Start: 2020-07-29 | End: 2020-07-30 | Stop reason: HOSPADM

## 2020-07-29 RX ORDER — DOCUSATE SODIUM 100 MG/1
100 CAPSULE, LIQUID FILLED ORAL 2 TIMES DAILY
Qty: 60 CAP | Refills: 0 | Status: SHIPPED | OUTPATIENT
Start: 2020-07-29 | End: 2021-10-04

## 2020-07-29 RX ORDER — CEFOTETAN DISODIUM 2 G/20ML
INJECTION, POWDER, FOR SOLUTION INTRAMUSCULAR; INTRAVENOUS PRN
Status: DISCONTINUED | OUTPATIENT
Start: 2020-07-29 | End: 2020-07-29 | Stop reason: SURG

## 2020-07-29 RX ORDER — BACLOFEN 20 MG
500 TABLET ORAL DAILY
COMMUNITY
End: 2022-03-28

## 2020-07-29 RX ORDER — ROCURONIUM BROMIDE 10 MG/ML
INJECTION, SOLUTION INTRAVENOUS PRN
Status: DISCONTINUED | OUTPATIENT
Start: 2020-07-29 | End: 2020-07-29 | Stop reason: SURG

## 2020-07-29 RX ORDER — TRAMADOL HYDROCHLORIDE 50 MG/1
50 TABLET ORAL EVERY 4 HOURS PRN
Qty: 15 TAB | Refills: 0 | Status: SHIPPED | OUTPATIENT
Start: 2020-07-29 | End: 2020-08-01

## 2020-07-29 RX ORDER — IBUPROFEN 600 MG/1
600 TABLET ORAL EVERY 6 HOURS
Qty: 45 TAB | Refills: 0 | Status: SHIPPED | OUTPATIENT
Start: 2020-07-29 | End: 2022-03-28

## 2020-07-29 RX ORDER — LIDOCAINE HYDROCHLORIDE 20 MG/ML
INJECTION, SOLUTION EPIDURAL; INFILTRATION; INTRACAUDAL; PERINEURAL PRN
Status: DISCONTINUED | OUTPATIENT
Start: 2020-07-29 | End: 2020-07-29 | Stop reason: SURG

## 2020-07-29 RX ORDER — METOCLOPRAMIDE HYDROCHLORIDE 5 MG/ML
INJECTION INTRAMUSCULAR; INTRAVENOUS PRN
Status: DISCONTINUED | OUTPATIENT
Start: 2020-07-29 | End: 2020-07-29 | Stop reason: SURG

## 2020-07-29 RX ORDER — KETOROLAC TROMETHAMINE 30 MG/ML
INJECTION, SOLUTION INTRAMUSCULAR; INTRAVENOUS PRN
Status: DISCONTINUED | OUTPATIENT
Start: 2020-07-29 | End: 2020-07-29 | Stop reason: SURG

## 2020-07-29 RX ORDER — SODIUM CHLORIDE, SODIUM LACTATE, POTASSIUM CHLORIDE, CALCIUM CHLORIDE 600; 310; 30; 20 MG/100ML; MG/100ML; MG/100ML; MG/100ML
INJECTION, SOLUTION INTRAVENOUS
Status: DISCONTINUED | OUTPATIENT
Start: 2020-07-29 | End: 2020-07-29 | Stop reason: SURG

## 2020-07-29 RX ORDER — SODIUM CHLORIDE, SODIUM LACTATE, POTASSIUM CHLORIDE, CALCIUM CHLORIDE 600; 310; 30; 20 MG/100ML; MG/100ML; MG/100ML; MG/100ML
1000 INJECTION, SOLUTION INTRAVENOUS CONTINUOUS
Status: DISCONTINUED | OUTPATIENT
Start: 2020-07-29 | End: 2020-07-30 | Stop reason: HOSPADM

## 2020-07-29 RX ORDER — MEPERIDINE HYDROCHLORIDE 25 MG/ML
12.5 INJECTION INTRAMUSCULAR; INTRAVENOUS; SUBCUTANEOUS
Status: DISCONTINUED | OUTPATIENT
Start: 2020-07-29 | End: 2020-07-30 | Stop reason: HOSPADM

## 2020-07-29 RX ORDER — HYDRALAZINE HYDROCHLORIDE 20 MG/ML
5 INJECTION INTRAMUSCULAR; INTRAVENOUS
Status: DISCONTINUED | OUTPATIENT
Start: 2020-07-29 | End: 2020-07-30 | Stop reason: HOSPADM

## 2020-07-29 RX ORDER — DIPHENHYDRAMINE HYDROCHLORIDE 50 MG/ML
12.5 INJECTION INTRAMUSCULAR; INTRAVENOUS
Status: DISCONTINUED | OUTPATIENT
Start: 2020-07-29 | End: 2020-07-30 | Stop reason: HOSPADM

## 2020-07-29 RX ORDER — ONDANSETRON 2 MG/ML
4 INJECTION INTRAMUSCULAR; INTRAVENOUS
Status: DISCONTINUED | OUTPATIENT
Start: 2020-07-29 | End: 2020-07-30 | Stop reason: HOSPADM

## 2020-07-29 RX ORDER — HYDROMORPHONE HYDROCHLORIDE 1 MG/ML
0.4 INJECTION, SOLUTION INTRAMUSCULAR; INTRAVENOUS; SUBCUTANEOUS
Status: DISCONTINUED | OUTPATIENT
Start: 2020-07-29 | End: 2020-07-30 | Stop reason: HOSPADM

## 2020-07-29 RX ORDER — MIDAZOLAM HYDROCHLORIDE 1 MG/ML
INJECTION INTRAMUSCULAR; INTRAVENOUS PRN
Status: DISCONTINUED | OUTPATIENT
Start: 2020-07-29 | End: 2020-07-29 | Stop reason: HOSPADM

## 2020-07-29 RX ORDER — ONDANSETRON 2 MG/ML
INJECTION INTRAMUSCULAR; INTRAVENOUS PRN
Status: DISCONTINUED | OUTPATIENT
Start: 2020-07-29 | End: 2020-07-29 | Stop reason: SURG

## 2020-07-29 RX ORDER — TRAMADOL HYDROCHLORIDE 50 MG/1
50 TABLET ORAL EVERY 4 HOURS PRN
Status: DISCONTINUED | OUTPATIENT
Start: 2020-07-29 | End: 2020-07-30 | Stop reason: HOSPADM

## 2020-07-29 RX ORDER — HYDROMORPHONE HYDROCHLORIDE 1 MG/ML
0.1 INJECTION, SOLUTION INTRAMUSCULAR; INTRAVENOUS; SUBCUTANEOUS
Status: DISCONTINUED | OUTPATIENT
Start: 2020-07-29 | End: 2020-07-30 | Stop reason: HOSPADM

## 2020-07-29 RX ORDER — ACETAMINOPHEN 325 MG/1
650 TABLET ORAL EVERY 6 HOURS
Qty: 60 TAB | Refills: 0 | Status: SHIPPED | OUTPATIENT
Start: 2020-07-29 | End: 2021-10-04

## 2020-07-29 RX ADMIN — MIDAZOLAM HYDROCHLORIDE 2 MG: 1 INJECTION, SOLUTION INTRAMUSCULAR; INTRAVENOUS at 20:53

## 2020-07-29 RX ADMIN — SODIUM CHLORIDE, POTASSIUM CHLORIDE, SODIUM LACTATE AND CALCIUM CHLORIDE: 600; 310; 30; 20 INJECTION, SOLUTION INTRAVENOUS at 20:53

## 2020-07-29 RX ADMIN — CEFOTETAN DISODIUM 2 G: 2 INJECTION, POWDER, FOR SOLUTION INTRAMUSCULAR; INTRAVENOUS at 20:57

## 2020-07-29 RX ADMIN — MEPERIDINE HYDROCHLORIDE 12.5 MG: 25 INJECTION INTRAMUSCULAR; INTRAVENOUS; SUBCUTANEOUS at 22:31

## 2020-07-29 RX ADMIN — KETOROLAC TROMETHAMINE 15 MG: 30 INJECTION, SOLUTION INTRAMUSCULAR at 21:37

## 2020-07-29 RX ADMIN — ONDANSETRON 4 MG: 2 INJECTION INTRAMUSCULAR; INTRAVENOUS at 21:07

## 2020-07-29 RX ADMIN — SODIUM CHLORIDE, POTASSIUM CHLORIDE, SODIUM LACTATE AND CALCIUM CHLORIDE 1000 ML: 600; 310; 30; 20 INJECTION, SOLUTION INTRAVENOUS at 22:37

## 2020-07-29 RX ADMIN — PROPOFOL 150 MG: 10 INJECTION, EMULSION INTRAVENOUS at 20:57

## 2020-07-29 RX ADMIN — METRONIDAZOLE 500 MG: 500 INJECTION, SOLUTION INTRAVENOUS at 19:25

## 2020-07-29 RX ADMIN — TRAMADOL HYDROCHLORIDE 50 MG: 50 TABLET, FILM COATED ORAL at 22:30

## 2020-07-29 RX ADMIN — ROCURONIUM BROMIDE 50 MG: 10 INJECTION, SOLUTION INTRAVENOUS at 20:57

## 2020-07-29 RX ADMIN — CEFTRIAXONE SODIUM 2 G: 2 INJECTION, POWDER, FOR SOLUTION INTRAMUSCULAR; INTRAVENOUS at 18:43

## 2020-07-29 RX ADMIN — METOCLOPRAMIDE 10 MG: 5 INJECTION, SOLUTION INTRAMUSCULAR; INTRAVENOUS at 21:07

## 2020-07-29 RX ADMIN — LIDOCAINE HYDROCHLORIDE 80 MG: 20 INJECTION, SOLUTION EPIDURAL; INFILTRATION; INTRACAUDAL at 20:57

## 2020-07-29 SDOH — ECONOMIC STABILITY: TRANSPORTATION INSECURITY
IN THE PAST 12 MONTHS, HAS LACK OF TRANSPORTATION KEPT YOU FROM MEETINGS, WORK, OR FROM GETTING THINGS NEEDED FOR DAILY LIVING?: NO

## 2020-07-29 SDOH — ECONOMIC STABILITY: TRANSPORTATION INSECURITY
IN THE PAST 12 MONTHS, HAS THE LACK OF TRANSPORTATION KEPT YOU FROM MEDICAL APPOINTMENTS OR FROM GETTING MEDICATIONS?: NO

## 2020-07-29 SDOH — ECONOMIC STABILITY: FOOD INSECURITY: WITHIN THE PAST 12 MONTHS, THE FOOD YOU BOUGHT JUST DIDN'T LAST AND YOU DIDN'T HAVE MONEY TO GET MORE.: NEVER TRUE

## 2020-07-29 SDOH — ECONOMIC STABILITY: FOOD INSECURITY: WITHIN THE PAST 12 MONTHS, YOU WORRIED THAT YOUR FOOD WOULD RUN OUT BEFORE YOU GOT MONEY TO BUY MORE.: NEVER TRUE

## 2020-07-29 ASSESSMENT — PATIENT HEALTH QUESTIONNAIRE - PHQ9
4. FEELING TIRED OR HAVING LITTLE ENERGY: SEVERAL DAYS
5. POOR APPETITE OR OVEREATING: NEARLY EVERY DAY
3. TROUBLE FALLING OR STAYING ASLEEP OR SLEEPING TOO MUCH: SEVERAL DAYS
9. THOUGHTS THAT YOU WOULD BE BETTER OFF DEAD, OR OF HURTING YOURSELF: NOT AT ALL
8. MOVING OR SPEAKING SO SLOWLY THAT OTHER PEOPLE COULD HAVE NOTICED. OR THE OPPOSITE, BEING SO FIGETY OR RESTLESS THAT YOU HAVE BEEN MOVING AROUND A LOT MORE THAN USUAL: NOT AT ALL
7. TROUBLE CONCENTRATING ON THINGS, SUCH AS READING THE NEWSPAPER OR WATCHING TELEVISION: NEARLY EVERY DAY
2. FEELING DOWN, DEPRESSED, IRRITABLE, OR HOPELESS: SEVERAL DAYS
SUM OF ALL RESPONSES TO PHQ9 QUESTIONS 1 AND 2: 2
6. FEELING BAD ABOUT YOURSELF - OR THAT YOU ARE A FAILURE OR HAVE LET YOURSELF OR YOUR FAMILY DOWN: MORE THAN HALF THE DAYS
1. LITTLE INTEREST OR PLEASURE IN DOING THINGS: SEVERAL DAYS
SUM OF ALL RESPONSES TO PHQ QUESTIONS 1-9: 12

## 2020-07-29 ASSESSMENT — PAIN SCALES - GENERAL: PAIN_LEVEL: 3

## 2020-07-29 ASSESSMENT — LIFESTYLE VARIABLES: EVER_SMOKED: NEVER

## 2020-07-29 NOTE — ED TRIAGE NOTES
"Nita Jackman   35 y.o. female   Chief Complaint   Patient presents with   • Sent by MD     pt started having pain in the RLQ monday, had an US outpatient which is suspicious for appendicitis.     Pt amb to triage with steady gait for above complaint.  Pt is alert and oriented, speaking in full sentences, follows commands and responds appropriately to questions. NAD. Resp are even and unlabored.   Pt placed in lobby. Pt educated on triage process. Pt encouraged to alert staff for any changes.    /92   Pulse 93   Temp 36.6 °C (97.9 °F) (Temporal)   Resp 18   Ht 1.727 m (5' 8\")   Wt 93 kg (205 lb)   LMP  (LMP Unknown) Comment: pt on oral BC  SpO2 99%   BMI 31.17 kg/m²     "

## 2020-07-30 VITALS
SYSTOLIC BLOOD PRESSURE: 136 MMHG | WEIGHT: 210 LBS | DIASTOLIC BLOOD PRESSURE: 84 MMHG | OXYGEN SATURATION: 93 % | HEART RATE: 77 BPM | RESPIRATION RATE: 18 BRPM | TEMPERATURE: 97.6 F | HEIGHT: 68 IN | BODY MASS INDEX: 31.83 KG/M2

## 2020-07-30 LAB
GLUCOSE BLD-MCNC: 123 MG/DL (ref 65–99)
PATHOLOGY CONSULT NOTE: NORMAL

## 2020-07-30 PROCEDURE — G0378 HOSPITAL OBSERVATION PER HR: HCPCS

## 2020-07-30 PROCEDURE — 96372 THER/PROPH/DIAG INJ SC/IM: CPT

## 2020-07-30 PROCEDURE — 82962 GLUCOSE BLOOD TEST: CPT

## 2020-07-30 PROCEDURE — 700111 HCHG RX REV CODE 636 W/ 250 OVERRIDE (IP): Performed by: SURGERY

## 2020-07-30 PROCEDURE — 700102 HCHG RX REV CODE 250 W/ 637 OVERRIDE(OP): Performed by: SURGERY

## 2020-07-30 PROCEDURE — 96376 TX/PRO/DX INJ SAME DRUG ADON: CPT

## 2020-07-30 PROCEDURE — A9270 NON-COVERED ITEM OR SERVICE: HCPCS | Performed by: SURGERY

## 2020-07-30 RX ORDER — HALOPERIDOL 5 MG/ML
1 INJECTION INTRAMUSCULAR EVERY 6 HOURS PRN
Status: DISCONTINUED | OUTPATIENT
Start: 2020-07-30 | End: 2020-07-30 | Stop reason: HOSPADM

## 2020-07-30 RX ORDER — ACETAMINOPHEN 500 MG
1000 TABLET ORAL EVERY 6 HOURS
Status: DISCONTINUED | OUTPATIENT
Start: 2020-07-30 | End: 2020-07-30 | Stop reason: HOSPADM

## 2020-07-30 RX ORDER — ONDANSETRON 2 MG/ML
4 INJECTION INTRAMUSCULAR; INTRAVENOUS EVERY 4 HOURS PRN
Status: DISCONTINUED | OUTPATIENT
Start: 2020-07-30 | End: 2020-07-30 | Stop reason: HOSPADM

## 2020-07-30 RX ORDER — DIPHENHYDRAMINE HYDROCHLORIDE 50 MG/ML
25 INJECTION INTRAMUSCULAR; INTRAVENOUS EVERY 6 HOURS PRN
Status: DISCONTINUED | OUTPATIENT
Start: 2020-07-30 | End: 2020-07-30 | Stop reason: HOSPADM

## 2020-07-30 RX ORDER — SCOLOPAMINE TRANSDERMAL SYSTEM 1 MG/1
1 PATCH, EXTENDED RELEASE TRANSDERMAL
Status: DISCONTINUED | OUTPATIENT
Start: 2020-07-30 | End: 2020-07-30 | Stop reason: HOSPADM

## 2020-07-30 RX ORDER — DEXAMETHASONE SODIUM PHOSPHATE 4 MG/ML
4 INJECTION, SOLUTION INTRA-ARTICULAR; INTRALESIONAL; INTRAMUSCULAR; INTRAVENOUS; SOFT TISSUE
Status: DISCONTINUED | OUTPATIENT
Start: 2020-07-30 | End: 2020-07-30 | Stop reason: HOSPADM

## 2020-07-30 RX ORDER — DEXTROSE MONOHYDRATE 25 G/50ML
50 INJECTION, SOLUTION INTRAVENOUS
Status: DISCONTINUED | OUTPATIENT
Start: 2020-07-30 | End: 2020-07-30 | Stop reason: HOSPADM

## 2020-07-30 RX ORDER — HYDROMORPHONE HYDROCHLORIDE 1 MG/ML
0.25 INJECTION, SOLUTION INTRAMUSCULAR; INTRAVENOUS; SUBCUTANEOUS
Status: DISCONTINUED | OUTPATIENT
Start: 2020-07-30 | End: 2020-07-30 | Stop reason: HOSPADM

## 2020-07-30 RX ORDER — KETOROLAC TROMETHAMINE 30 MG/ML
30 INJECTION, SOLUTION INTRAMUSCULAR; INTRAVENOUS EVERY 6 HOURS
Status: DISCONTINUED | OUTPATIENT
Start: 2020-07-30 | End: 2020-07-30 | Stop reason: HOSPADM

## 2020-07-30 RX ADMIN — KETOROLAC TROMETHAMINE 30 MG: 30 INJECTION, SOLUTION INTRAMUSCULAR at 09:00

## 2020-07-30 RX ADMIN — ACETAMINOPHEN 1000 MG: 500 TABLET ORAL at 11:24

## 2020-07-30 RX ADMIN — KETOROLAC TROMETHAMINE 30 MG: 30 INJECTION, SOLUTION INTRAMUSCULAR at 04:53

## 2020-07-30 RX ADMIN — ENOXAPARIN SODIUM 40 MG: 40 INJECTION SUBCUTANEOUS at 04:53

## 2020-07-30 RX ADMIN — ACETAMINOPHEN 1000 MG: 500 TABLET ORAL at 04:53

## 2020-07-30 RX ADMIN — ACETAMINOPHEN 1000 MG: 500 TABLET ORAL at 00:59

## 2020-07-30 ASSESSMENT — LIFESTYLE VARIABLES
DOES PATIENT WANT TO STOP DRINKING: NO
HAVE PEOPLE ANNOYED YOU BY CRITICIZING YOUR DRINKING: NO
HOW MANY TIMES IN THE PAST YEAR HAVE YOU HAD 5 OR MORE DRINKS IN A DAY: 5
ALCOHOL_USE: YES
HAVE YOU EVER FELT YOU SHOULD CUT DOWN ON YOUR DRINKING: NO
TOTAL SCORE: 0
EVER FELT BAD OR GUILTY ABOUT YOUR DRINKING: NO
ON A TYPICAL DAY WHEN YOU DRINK ALCOHOL HOW MANY DRINKS DO YOU HAVE: 2
AVERAGE NUMBER OF DAYS PER WEEK YOU HAVE A DRINK CONTAINING ALCOHOL: 1
TOTAL SCORE: 0
CONSUMPTION TOTAL: POSITIVE
TOTAL SCORE: 0
EVER HAD A DRINK FIRST THING IN THE MORNING TO STEADY YOUR NERVES TO GET RID OF A HANGOVER: NO

## 2020-07-30 ASSESSMENT — COPD QUESTIONNAIRES
COPD SCREENING SCORE: 0
HAVE YOU SMOKED AT LEAST 100 CIGARETTES IN YOUR ENTIRE LIFE: NO/DON'T KNOW
DURING THE PAST 4 WEEKS HOW MUCH DID YOU FEEL SHORT OF BREATH: NONE/LITTLE OF THE TIME
DO YOU EVER COUGH UP ANY MUCUS OR PHLEGM?: NO/ONLY WITH OCCASIONAL COLDS OR INFECTIONS
IN THE PAST 12 MONTHS DO YOU DO LESS THAN YOU USED TO BECAUSE OF YOUR BREATHING PROBLEMS: DISAGREE/UNSURE

## 2020-07-30 ASSESSMENT — COGNITIVE AND FUNCTIONAL STATUS - GENERAL
DAILY ACTIVITIY SCORE: 24
SUGGESTED CMS G CODE MODIFIER MOBILITY: CH
MOBILITY SCORE: 24
SUGGESTED CMS G CODE MODIFIER DAILY ACTIVITY: CH

## 2020-07-30 NOTE — ANESTHESIA PROCEDURE NOTES
Airway    Date/Time: 7/29/2020 8:58 PM  Performed by: Sdaie Londono M.D.  Authorized by: Sadie Londono M.D.     Location:  OR  Urgency:  Elective  Difficult Airway: No    Indications for Airway Management:  Anesthesia      Spontaneous Ventilation: absent    Sedation Level:  Deep  Preoxygenated: Yes    Patient Position:  Sniffing  MILS Maintained Throughout: No    Mask Difficulty Assessment:  1 - vent by mask  Final Airway Type:  Endotracheal airway  Final Endotracheal Airway:  ETT  Cuffed: Yes    Technique Used for Successful ETT Placement:  Direct laryngoscopy    Insertion Site:  Oral  Blade Type:  Lottie  Laryngoscope Blade/Videolaryngoscope Blade Size:  4  ETT Size (mm):  7.0  Measured from:  Lips  ETT to Lips (cm):  22  Placement Verified by: auscultation and capnometry    Cormack-Lehane Classification:  Grade IIa - partial view of glottis  Number of Attempts at Approach:  1  Number of Other Approaches Attempted:  0

## 2020-07-30 NOTE — ANESTHESIA POSTPROCEDURE EVALUATION
Patient: Nita Jackman    Procedure Summary     Date:  07/29/20 Room / Location:  [facility] 09 / SURGERY [facility]    Anesthesia Start:  2053 Anesthesia Stop:  2151    Procedure:  APPENDECTOMY, LAPAROSCOPIC (Abdomen) Diagnosis:  (Appendicitis)    Surgeon:  Mac Rouse M.D. Responsible Provider:  Sadie Londono M.D.    Anesthesia Type:  general ASA Status:  2 - Emergent          Final Anesthesia Type: general  Last vitals  BP   Blood Pressure: 135/73    Temp   36.8 °C (98.2 °F)    Pulse   Pulse: (Abnormal) 108   Resp   15    SpO2   95 %      Anesthesia Post Evaluation    Patient location during evaluation: PACU  Patient participation: complete - patient participated  Level of consciousness: awake and alert  Pain score: 3    Airway patency: patent  Anesthetic complications: no  Cardiovascular status: hemodynamically stable  Respiratory status: acceptable  Hydration status: euvolemic    PONV: none           Nurse Pain Score: 3 (NPRS)

## 2020-07-30 NOTE — PROGRESS NOTES
Pt A&O x'4, VSS, medicated with scheduled medications.Pt is tolerating diet, denies any N/V/D, + flatus, +hyperactive BS. Lap sites with YOAN Antoine. POC discussed with pt, all questions and concerns have been addressed. Bed in locked, lowest position, call light and personal items within reach. Will continue to advance to discharge.

## 2020-07-30 NOTE — PROGRESS NOTES
2 RN skin check:    Lap sites x3 with Dermabond. All bony prominences inspected; no abnormalities noted.    All other skin intact.

## 2020-07-30 NOTE — OR NURSING
Pt resting comfprtably in bed. Pain is tolerable. Ice pack on pt's abdomen. Pt has 3 lap stabs on abdomen with dermabond closure, CDI. VSS. Pt is on ERAS prototcol with O2 at 10 L via oxymask SPO2 at 100%. Pt's , Efraín, updated on pt's status and that she will stay overnight in the hospital. Awaiting room assignment now, all questions answered, no other needs at this time.

## 2020-07-30 NOTE — PROGRESS NOTES
Received report from PAC-U.  Pt arrived to T411 bed 2 via gurney  No immediate distress.  A&Ox4  Pain controlled at this time  Denies n/v  Drains n/a  Oriented to room, educated on welcome packet, white board, TV, call light, call before fall, plan of care, oral care expectations, ambulation goals, and up to chair for all meals goal.  Call light and personal belongings within reach.  Fall precautions and hourly rounding in place

## 2020-07-30 NOTE — ANESTHESIA TIME REPORT
Anesthesia Start and Stop Event Times     Date Time Event    7/29/2020 08:42 PM Ready for Procedure    7/29/2020 08:53 PM Anesthesia Start    7/29/2020 09:51 PM Anesthesia Stop        Responsible Staff  07/29/20    Name Role Begin End    Sadie Londono M.D. Anesthesiologist 07/29/20 08:53 PM 07/29/20 09:51 PM        Preop Diagnosis (Free Text):  Pre-op Diagnosis     Appendicitis        Preop Diagnosis (Codes):    Post op Diagnosis  Acute appendicitis      Premium Reason  B. 1st Call    Comments: Lap. Appendectomy

## 2020-07-30 NOTE — ED NOTES
Med rec updated and complete. Allergies reviewed. Pt stated that she  Her tresiba at 1930. Pt stated that she brought it with her. She knew she probably would be staying.      No antibiotic use in last 14 days.      Home [facility]

## 2020-07-30 NOTE — ED PROVIDER NOTES
ED Provider Note    CHIEF COMPLAINT  Chief Complaint   Patient presents with   • Sent by MD     pt started having pain in the RLQ monday, had an US outpatient which is suspicious for appendicitis.       HPI  Nita Jackman is a 35 y.o. female who presents to the emergency room today with right lower quadrant abdominal pain.  Patient states this started on Monday with some nausea and chills symptoms are got worse today prompting to go to the urgent care where she had an ultrasound performed showing acute appendicitis and was sent to the ER.  Pain is described sharp stabbing over her bellybutton and then radiates to the right lower quadrant currently rated at a 7/10.  No chest pain or shortness of breath no fever chills or changes to bowel or bladder.  Patient does have a history of diabetes type 1.    REVIEW OF SYSTEMS  See HPI for further details. All other systems are negative.     PAST MEDICAL HISTORY  Past Medical History:   Diagnosis Date   • Asthma    • Diabetes    • H/O diabetic neuropathy    • Hx of diabetic retinopathy    • Retinopathy due to secondary diabetes (HCC)    • Thyroid disorder        FAMILY HISTORY  [unfilled]    SOCIAL HISTORY  Social History     Socioeconomic History   • Marital status:      Spouse name: Not on file   • Number of children: Not on file   • Years of education: Not on file   • Highest education level: Not on file   Occupational History   • Not on file   Social Needs   • Financial resource strain: Not on file   • Food insecurity     Worry: Never true     Inability: Never true   • Transportation needs     Medical: No     Non-medical: No   Tobacco Use   • Smoking status: Never Smoker   • Smokeless tobacco: Never Used   Substance and Sexual Activity   • Alcohol use: Yes     Alcohol/week: 1.2 - 1.8 oz     Types: 2 - 3 Glasses of wine per week     Binge frequency: Weekly   • Drug use: No   • Sexual activity: Yes     Partners: Male     Birth control/protection: Other-See Comments    Lifestyle   • Physical activity     Days per week: Not on file     Minutes per session: Not on file   • Stress: Not on file   Relationships   • Social connections     Talks on phone: Not on file     Gets together: Not on file     Attends Yazdanism service: Not on file     Active member of club or organization: Not on file     Attends meetings of clubs or organizations: Not on file     Relationship status: Not on file   • Intimate partner violence     Fear of current or ex partner: Not on file     Emotionally abused: Not on file     Physically abused: Not on file     Forced sexual activity: Not on file   Other Topics Concern   • Not on file   Social History Narrative   • Not on file       SURGICAL HISTORY  Past Surgical History:   Procedure Laterality Date   • CARPAL TUNNEL ENDOSCOPIC  5/24/2011    Performed by WAYLON COLEMAN at SURGERY [facility] ORS   • VITRECTOMY POSTERIOR  5/4/2009    Performed by DARYL DASILVA at SURGERY SAME DAY [facility] ORS   • EYE CRYOSURGERY  5/4/2009    Performed by DARYL DASILVA at SURGERY SAME DAY [facility] ORS   • RETINAL DETACHMENT REPAIR  7/11/2008    left/right eye       CURRENT MEDICATIONS  Home Medications     Reviewed by Susannah Kim R.N. (Registered Nurse) on 07/29/20 at 2354  Med List Status: Complete   Medication Last Dose Status   albuterol 108 (90 Base) MCG/ACT Aero Soln inhalation aerosol 7/24/2020 Active   Ascorbic Acid (VITAMIN C) 1000 MG Tab 7/28/2020 Active   aspirin EC (ECOTRIN) 81 MG Tablet Delayed Response 7/29/2020 Active   cetirizine (ZYRTEC) 10 MG TABS 7/29/2020 Active   cholecalciferol (VITAMIN D3) 400 UNIT Tab 7/29/2020 Active   gabapentin (NEURONTIN) 600 MG tablet 7/28/2020 Active   insulin aspart (NOVOLOG) 100 UNIT/ML Solution 7/29/2020 Active   LAYOLIS FE 0.8-25 MG-MCG Chew Tab 7/29/2020 Active   levothyroxine (SYNTHROID) 125 MCG Tab 7/29/2020 Active   Magnesium Oxide 500 MG Tab 7/29/2020 Active   omeprazole (PRILOSEC) 40 MG  "delayed-release capsule 7/29/2020 Active   pregabalin (LYRICA) 50 MG capsule 7/29/2020 Active   TRESIBA FLEXTOUCH 100 UNIT/ML Solution Pen-injector 7/29/2020 Active   Zinc 50 MG Cap 7/28/2020 Active                ALLERGIES  Allergies   Allergen Reactions   • Percocet [Oxycodone-Acetaminophen] Itching     And nausea   • Vicodin [Hydrocodone-Acetaminophen] Vomiting       PHYSICAL EXAM  VITAL SIGNS: /74   Pulse 89   Temp 36.1 °C (97 °F) (Temporal)   Resp 17   Ht 1.727 m (5' 8\")   Wt 95.3 kg (210 lb)   LMP  (LMP Unknown) Comment: pt on oral BC  SpO2 100%   Breastfeeding No   BMI 31.93 kg/m²       Constitutional: Well developed, Well nourished,  acute distress, Non-toxic appearance.   HENT: Normocephalic, Atraumatic, Bilateral external ears normal, Oropharynx moist, No oral exudates, Nose normal.   Eyes: PERRLA, EOMI, Conjunctiva normal, No discharge.   Neck: Normal range of motion, No tenderness, Supple, No stridor.   Lymphatic: No lymphadenopathy noted.   Cardiovascular: Normal heart rate, Normal rhythm, No murmurs, No rubs, No gallops.   Thorax & Lungs: Normal breath sounds, No respiratory distress, No wheezing, No chest tenderness.   Abdomen: Bowel sounds normal, Soft, lower quadrant tenderness, No masses, No pulsatile masses.  Rebound, guarding or peritoneal signs noted  Skin: Warm, Dry, No erythema, No rash.   Back: No tenderness, No CVA tenderness.     Extremities: Intact distal pulses, No edema, No tenderness, No cyanosis, No clubbing.   Musculoskeletal: Good range of motion in all major joints. No tenderness to palpation or major deformities noted.   Neurologic: Alert & oriented x 3, Normal motor function, Normal sensory function, No focal deficits noted.   Psychiatric: Affect normal, Judgment normal, Mood normal.       RADIOLOGY/PROCEDURES  Ultra sound shows evidence of acute appendicitis    COURSE & MEDICAL DECISION MAKING  Pertinent Labs & Imaging studies reviewed. (See chart for " details)  Patient has acute appendicitis started on antibiotics Rocephin/Flagyl will be taken to the OR for appendectomy discussed case with Dr. Rouse from surgery department.    FINAL IMPRESSION  1.  Acute appendicitis  2.  Acute right lower quadrant abdominal pain secondary #1  3.  Type 1 diabetes mellitus by history         Electronically signed by: Nathanael Aguayo D.O., 7/30/2020 2:02 AM

## 2020-07-30 NOTE — ANESTHESIA QCDR
2019 [facility] Clinical Data Registry (for Quality Improvement)     Postoperative nausea/vomiting risk protocol (Adult = 18 yrs and Pediatric 3-17 yrs)- (430 and 463)  General inhalation anesthetic (NOT TIVA) with PONV risk factors: Yes  Provision of anti-emetic therapy with at least 2 different classes of agents: Yes   Patient DID NOT receive anti-emetic therapy and reason is documented in Medical Record:  N/A    Multimodal Pain Management- (477)  Non-emergent surgery AND patient age >= 18: No  Use of Multimodal Pain Management, two or more drugs and/or interventions, NOT including systemic opioids:   Exception: Documented allergy to multiple classes of analgesics:     Smoking Abstinence (404)  Patient is current smoker (cigarette, pipe, e-cig, marijuanna): No  Elective Surgery:   Abstinence instructions provided prior to day of surgery:   Patient abstained from smoking on day of surgery:     Pre-Op Beta-Blocker in Isolated CABG (44)  Isolated CABG AND patient age >= 18: No  Beta-blocker admin within 24 hours of surgical incision:   Exception:of medical reason(s) for not administering beta blocker within 24 hours prior to surgical incision (e.g., not  indicated,other medical reason):     PACU assessment of acute postoperative pain prior to Anesthesia Care End- Applies to Patients Age = 18- (ABG7)  Initial PACU pain score is which of the following: < 7/10  Patient unable to report pain score: N/A    Post-anesthetic transfer of care checklist/protocol to PACU/ICU- (426 and 427)  Upon conclusion of case, patient transferred to which of the following locations: PACU/Non-ICU  Use of transfer checklist/protocol: Yes  Exclusion: Service Performed in Patient Hospital Room (and thus did not require transfer): N/A  Unplanned admission to ICU related to anesthesia service up through end of PACU care- (MD51)  Unplanned admission to ICU (not initially anticipated at anesthesia start time): No

## 2020-07-30 NOTE — DISCHARGE PLANNING
Care Transition Team Discharge Planning     Discharge Plan:  Home    This RN CM is following the case and will assist with discharge as needed.

## 2020-07-30 NOTE — DISCHARGE INSTRUCTIONS
Laparoscopic Appendectomy D/C instructions:    DIET: Upon discharge from the hospital you may resume your normal pre-operative diet. Depending on how you are feeling and whether you have nausea or not, you may wish to stay with a bland diet for the first few days. However, you can advance this as quickly as you feel ready.    ACTIVITIES: After discharge from the hospital, you may resume full routine activities. However, there should be no heavy lifting (greater than 15 pounds) and no strenuous activities until after your follow-up visit. Otherwise, routine activities of daily living are acceptable.    DRIVING: You may drive whenever you are off pain medications and are able to perform the activities needed to drive, i.e. turning, bending, twisting, etc.    BATHING: You may get the wound wet at any time after leaving the hospital. You may shower, but do not submerge in a bath for at least a week. Dressings may come off after 48 hours.    BOWEL FUNCTION: A few patients, after this operation, will develop either frequent or loose stools after meals. This usually corrects itself after a few days, to a few weeks. If this occurs, do not worry; it is not unusual and will resolve. Much more common than loose stools, is constipation. The combination of pain medication and decreased activity level can cause constipation in otherwise normal patients. If you feel this is occurring, take a laxative (Milk of Magnesia, Ex-Lax, Senokot, etc.) until the problem has resolved.    PAIN MEDICATION: You will be given a prescription for pain medication at discharge. Please take these as directed. It is important to remember not to take medications on an empty stomach as this may cause nausea.    CALL IF YOU HAVE: (1) Fevers to more than 1010 F, (2) Unusual chest or leg pain, (3) Drainage or fluid from incision that may be foul smelling, increased tenderness or soreness at the wound or the wound edges are no longer together, redness or  swelling at the incision site. Please do not hesitate to call with any other questions.     APPOINTMENT: Contact our office at [phone] for a follow-up appointment in 1 week following your procedure.    If you have any additional questions, please do not hesitate to call the office.    Office address:  [address], [address] [place] [address]    Discharge Instructions    Discharged to home by car with relative. Discharged via walking, hospital escort: Yes.  Special equipment needed: Not Applicable    Be sure to schedule a follow-up appointment with your primary care doctor or any specialists as instructed.     Discharge Plan:   Diet Plan: Discussed  Activity Level: Discussed  Confirmed Follow up Appointment: Patient to Call and Schedule Appointment  Confirmed Symptoms Management: Discussed  Medication Reconciliation Updated: Yes    I understand that a diet low in cholesterol, fat, and sodium is recommended for good health. Unless I have been given specific instructions below for another diet, I accept this instruction as my diet prescription.   Other diet: Regular as tolerated     Special Instructions: None    · Is patient discharged on Warfarin / Coumadin?   No     Depression / Suicide Risk    As you are discharged from this [facility] facility, it is important to learn how to keep safe from harming yourself.    Recognize the warning signs:  · Abrupt changes in personality, positive or negative- including increase in energy   · Giving away possessions  · Change in eating patterns- significant weight changes-  positive or negative  · Change in sleeping patterns- unable to sleep or sleeping all the time   · Unwillingness or inability to communicate  · Depression  · Unusual sadness, discouragement and loneliness  · Talk of wanting to die  · Neglect of personal appearance   · Rebelliousness- reckless behavior  · Withdrawal from people/activities they love  · Confusion- inability to concentrate     If you or a  loved one observes any of these behaviors or has concerns about self-harm, here's what you can do:  · Talk about it- your feelings and reasons for harming yourself  · Remove any means that you might use to hurt yourself (examples: pills, rope, extension cords, firearm)  · Get professional help from the community (Mental Health, Substance Abuse, psychological counseling)  · Do not be alone:Call your Safe Contact- someone whom you trust who will be there for you.  · Call your local CRISIS HOTLINE 992-1872 or 558-232-3298  · Call your local Children's Mobile Crisis Response Team [place] (750) 152-2634 or www.Hang w/  · Call the toll free National Suicide Prevention Hotlines   · National Suicide Prevention Lifeline 774-333-SNOG (9274)  · National Hope Line Network 800-SUICIDE (081-9398)

## 2020-07-30 NOTE — H&P
CHIEF COMPLAINT: Right lower quadrant pain.     HISTORY OF PRESENT ILLNESS: The patient is a 35 year-old White woman who presents to the Emergency Department with a 3- day history of moderate and localized right lower quadrant abdominal pain. The pain is associated with nausea and anorexia. The patient denies any recent or intercurrent illness. The patient denies any history of previous abdominal surgery.     PAST MEDICAL HISTORY:  has a past medical history of Asthma, Diabetes, H/O diabetic neuropathy, diabetic retinopathy, Retinopathy due to secondary diabetes (HCC), and Thyroid disorder.    PAST SURGICAL HISTORY:  has a past surgical history that includes vitrectomy posterior (5/4/2009); eye cryosurgery (5/4/2009); retinal detachment repair (7/11/2008); and carpal tunnel endoscopic (5/24/2011).     ALLERGIES:   Allergies   Allergen Reactions   • Percocet [Oxycodone-Acetaminophen] Itching     And nausea   • Vicodin [Hydrocodone-Acetaminophen] Vomiting        CURRENT MEDICATIONS:   Home Medications     Reviewed by Claude Gee (Pharmacy Tech) on 07/29/20 at 1946  Med List Status: Partial   Medication Last Dose Status   albuterol 108 (90 Base) MCG/ACT Aero Soln inhalation aerosol 7/24/2020 Active   Ascorbic Acid (VITAMIN C) 1000 MG Tab 7/28/2020 Active   aspirin EC (ECOTRIN) 81 MG Tablet Delayed Response 7/29/2020 Active   cetirizine (ZYRTEC) 10 MG TABS 7/29/2020 Active   cholecalciferol (VITAMIN D3) 400 UNIT Tab 7/29/2020 Active   gabapentin (NEURONTIN) 600 MG tablet 7/28/2020 Active   insulin aspart (NOVOLOG) 100 UNIT/ML Solution 7/29/2020 Active   LAYOLIS FE 0.8-25 MG-MCG Chew Tab 7/29/2020 Active   levothyroxine (SYNTHROID) 125 MCG Tab 7/29/2020 Active   Magnesium Oxide 500 MG Tab 7/29/2020 Active   omeprazole (PRILOSEC) 40 MG delayed-release capsule 7/29/2020 Active   pregabalin (LYRICA) 50 MG capsule 7/29/2020 Active   TRESIBA FLEXTOUCH 100 UNIT/ML Solution Pen-injector 7/29/2020 Active   Zinc 50 MG  "Cap 7/28/2020 Active                FAMILY HISTORY:   Family History   Problem Relation Age of Onset   • Hyperlipidemia Father    • Diabetes Other    • Hypertension Other    • Stroke Other        SOCIAL HISTORY:   Social History     Tobacco Use   • Smoking status: Never Smoker   • Smokeless tobacco: Never Used   Substance and Sexual Activity   • Alcohol use: Yes     Alcohol/week: 1.2 - 1.8 oz     Types: 2 - 3 Glasses of wine per week     Binge frequency: Weekly   • Drug use: No   • Sexual activity: Yes     Partners: Male     Birth control/protection: Other-See Comments       REVIEW OF SYSTEMS: Comprehensive review of systems is negative with the exception of the aforementioned HPI, PMH, and PSH bullets in accordance with CMS guidelines.     PHYSICAL EXAMINATION:   GENERAL: alert in no acute distress.   VITAL SIGNS: /92   Pulse 87   Temp 36.6 °C (97.9 °F) (Temporal)   Resp 16   Ht 1.727 m (5' 8\")   Wt 93 kg (205 lb)   SpO2 98%   HEAD AND NECK: Demonstrates symmetric, reactive pupils. Extraocular muscles   are intact. Nares and oropharynx are clear.   NECK: Supple. No adenopathy.  CHEST:Clear to auscultation bilaterally.    CARDIOVASCULAR:   Inspection: The skin is warm and dry.  Auscultation: Regular rate and rhythm.   Peripheral Pulses: Normal.    ABDOMEN: Inspection:   Abdominal inspection reveals no abrasions, contusions, lacerations or penetrating wounds.   Palpation: Palpation is remarkable for moderate tenderness in the right lower quadrant region. No abdominal wall hernias.  EXTREMITIES:   Examination of the upper and lower extremities demonstrates no cyanosis edema or clubbing.  NEUROLOGIC:   Alert & oriented to person, time and place. Normal motor function. Normal sensory function. No focal deficits noted.    LABORATORY VALUES:   Recent Labs     07/29/20  1802   WBC 8.7   RBC 4.90   HEMOGLOBIN 13.1   HEMATOCRIT 41.9   MCV 85.5   MCH 26.7*   MCHC 31.3*   RDW 44.8   PLATELETCT 330   MPV 10.0 "     Recent Labs     07/29/20  1802   SODIUM 137   POTASSIUM 3.9   CHLORIDE 100   CO2 25   GLUCOSE 142*   BUN 10   CREATININE 0.81   CALCIUM 9.4     Recent Labs     07/29/20  1802   INR 0.93     Recent Labs     07/29/20  1802   INR 0.93        IMAGING:   No orders to display       IMPRESSION AND PLAN:  Plan for laparoscopic appendectomy    Donato Score  [facility] NSQIP Surgical Risk Calculator    The patient will be taken to the operating room for laparoscopic appendectomy.  The surgical conduct was discussed in detail. Potential complications including, but not limited to, infection, bleeding, damage to adjacent structures, and anesthetic complications were discussed. Questions were elicited and answered to the patient's satisfaction. Operative consent signed.     ____________________________________     Mac Rouse M.D.    DD: 7/29/2020  8:31 PM

## 2020-07-30 NOTE — PROGRESS NOTES
"S: Nita Jackman  is a 35 y.o.  female admitted for acute appendicitis, sp lap appy      O:  /71   Pulse 86   Temp 36.4 °C (97.5 °F) (Temporal)   Resp 16   Ht 1.727 m (5' 8\")   Wt 95.3 kg (210 lb)   SpO2 100%   Intake/Output       07/28/20 0700 - 07/29/20 0659 (Not Admitted) 07/29/20 0700 - 07/30/20 0659 07/30/20 0700 - 07/31/20 0659      [id number] 8955-5514 Total 9256-3187 5837-5209 Total 0075-6815 8890-1701 Total       Intake    P.O.  --  -- --  --  50 50  --  -- --    P.O. -- -- -- -- 50 50 -- -- --    I.V.  --  -- --  --  1338.3 1338.3  --  -- --    Volume (mL) (Lactated Ringers) -- -- -- -- 800 800 -- -- --    Volume (mL) (Lactated Ringers) -- -- -- -- 538.3 538.3 -- -- --    Total Intake -- -- -- -- 1388.3 1388.3 -- -- --       Output    Blood  --  -- --  --  10 10  --  -- --    Est. Blood Loss -- -- -- -- 10 10 -- -- --    Total Output -- -- -- -- 10 10 -- -- --       Net I/O     -- -- -- -- 1378.3 1378.3 -- -- --        Recent Labs     07/29/20  1802   SODIUM 137   POTASSIUM 3.9   CHLORIDE 100   CO2 25   GLUCOSE 142*   BUN 10   CREATININE 0.81   CALCIUM 9.4     Recent Labs     07/29/20  1802   WBC 8.7   RBC 4.90   HEMOGLOBIN 13.1   HEMATOCRIT 41.9   MCV 85.5   MCH 26.7*   MCHC 31.3*   RDW 44.8   PLATELETCT 330   MPV 10.0       Alert and Oriented x3, No Acute Distress  Normal Respiratory Effort  Abdomen soft, appropriately tender  Incisions/Bandages clean/dry/intact  Extremities warm and well perfused    A/P:  Doing well, plan for dc home today    Mac Rouse MD  [facility]    "

## 2020-07-30 NOTE — OP REPORT
Operative Report    Date: 7/29/2020    Surgeon: Mac Rouse M.D.     Assistant: POWER Tabor    Pre-operative Diagnosis: Acute Appendicitis    Post-operative Diagnosis: Same     Procedure: Laparoscopic Appendectomy    ASA Classification: II.    Indications: This is a 35 y.o. female who presented with symptoms of right lower quadrant pain. History, physical and diagnostic studies are consistent with acute appendicitis.     Findings: Acute nonperforated appendicitis    Wound Classification: Class II, II, Clean Contaminated..    Procedure in detail: The patient was seen and examined in the preoperative holding area.  The risks benefits and alternatives of the procedure were discussed with the patient who wished to proceed with the procedure as described.  The patient was transferred to the operating room placed in supine position and all pressure points were properly padded.  General endotracheal anesthesia was induced and preoperative antibiotics were given per SCIP protocol.  Patient's abdomen was prepped with ChloraPrep and draped in the normal sterile fashion.  A timeout was performed confirming correct patient, correct procedure, and that all necessary equipment was in the room.      After infiltration of local anesthetic, an incision was made in the supraumbilical position.  A combination of blunt and electrocautery dissection was used down to the fascia.  The umbilical stalk was grasped elevated and the fascia was sharply incised.  A figure-of-eight 0 vicryl suture was placed across the fascial opening.  The Peterson port was introduced and pneumoperitoneum was achieved and maintained at 15 mmHg carbon dioxide throughout the entirety of the case.   The 5 mm camera was introduced and the abdomen was inspected and no underlying trauma was identified from abdominal entry. After infusing local anesthetic under direct visualization a 5 mm suprapubic and 5 mm left lower quadrant port were placed.    The cecum  and appendix were identified in the right lower quadrant after carefully mobilizing surrounding bowel.  The appendix was acutely inflamed. The appendix was elevated with an atraumatic grasper, a window was created in its mesentery with a [place] dissector, and the appendiceal mesentery was divided with the laparoscopic LigaSure device. The base of the appendix was divided with a single fire of the blue load of the endoGIA stapler. The appendix was placed in an endocatch bag and removed from the Peterson port. The area was irrigated and the appendiceal artery and base were hemostatic.    All ports were removed with video assist, and were hemostatic. The previously placed vicryl suture were tied. All skin sites were closed with subcuticular Monocryl and Dermabond was placed over the wounds.    The patient was awakened from general anesthetic, and was taken to the recovery room in stable condition.    Sponge and needle counts were correct at the end of the case.     Specimen: appendix for permanent pathology    EBL: 10mL    Dispo: stable, extubated, to PACU    Mac Rouse M.D.  [facility]  [phone]

## 2020-08-28 DIAGNOSIS — E10.42 DIABETIC POLYNEUROPATHY ASSOCIATED WITH TYPE 1 DIABETES MELLITUS (HCC): ICD-10-CM

## 2020-08-28 RX ORDER — PREGABALIN 50 MG/1
CAPSULE ORAL
Qty: 270 CAP | Refills: 3 | Status: SHIPPED | OUTPATIENT
Start: 2020-08-28 | End: 2021-03-16 | Stop reason: SDUPTHER

## 2020-12-29 DIAGNOSIS — Z76.89 ENCOUNTER TO ESTABLISH CARE WITH NEW DOCTOR: ICD-10-CM

## 2020-12-31 RX ORDER — LEVOTHYROXINE SODIUM 0.12 MG/1
125 TABLET ORAL EVERY MORNING
Qty: 90 TAB | Refills: 0 | Status: SHIPPED | OUTPATIENT
Start: 2020-12-31 | End: 2021-04-05

## 2021-01-07 ENCOUNTER — OFFICE VISIT (OUTPATIENT)
Dept: NEUROLOGY | Facility: MEDICAL CENTER | Age: 36
End: 2021-01-07
Attending: PSYCHIATRY & NEUROLOGY
Payer: COMMERCIAL

## 2021-01-07 VITALS
OXYGEN SATURATION: 94 % | DIASTOLIC BLOOD PRESSURE: 62 MMHG | WEIGHT: 219.8 LBS | HEART RATE: 94 BPM | SYSTOLIC BLOOD PRESSURE: 124 MMHG | HEIGHT: 68 IN | TEMPERATURE: 97.5 F | BODY MASS INDEX: 33.31 KG/M2

## 2021-01-07 DIAGNOSIS — E10.42 DIABETIC POLYNEUROPATHY ASSOCIATED WITH TYPE 1 DIABETES MELLITUS (HCC): Primary | ICD-10-CM

## 2021-01-07 DIAGNOSIS — G25.81 RLS (RESTLESS LEGS SYNDROME): ICD-10-CM

## 2021-01-07 PROCEDURE — 99213 OFFICE O/P EST LOW 20 MIN: CPT | Performed by: PSYCHIATRY & NEUROLOGY

## 2021-01-07 PROCEDURE — 99202 OFFICE O/P NEW SF 15 MIN: CPT | Performed by: PSYCHIATRY & NEUROLOGY

## 2021-01-07 PROCEDURE — 99212 OFFICE O/P EST SF 10 MIN: CPT | Performed by: PSYCHIATRY & NEUROLOGY

## 2021-01-07 RX ORDER — ESCITALOPRAM OXALATE 10 MG/1
10 TABLET ORAL DAILY
COMMUNITY

## 2021-01-07 RX ORDER — GABAPENTIN 600 MG/1
600 TABLET ORAL EVERY EVENING
Qty: 90 TAB | Refills: 3 | Status: SHIPPED | OUTPATIENT
Start: 2021-01-07 | End: 2021-11-15

## 2021-01-07 ASSESSMENT — ENCOUNTER SYMPTOMS
FALLS: 0
SENSORY CHANGE: 1
SPEECH CHANGE: 0
MEMORY LOSS: 0
INSOMNIA: 0
FOCAL WEAKNESS: 0

## 2021-01-08 NOTE — PROGRESS NOTES
Subjective:      Nita Jackman is a 35 y.o. female who presents for 1 year follow-up, with a history of a diabetic polyneuropathy and symptomatic RLS.    HPI    Neuropathy: Still on Lyrica 50 mg in the morning and 100 g in the evening, taking the last dose about 2 hours before going to bed, she is still having occasional jabbing pain into the feet, on the left over the lateral aspect of the foot, the right into the calf itself.  The symptoms tend to happen after she is fallen asleep and can awaken her.  They can happen during the daytime.  Their frequency has not been increasing but it is becoming a bit disconcerting nonetheless.    She is walking safely, she denies any foot drop, the hands have not been similarly involved either from a sensory standpoint, there is certainly no motor deficits or loss of dexterity.    On the Lyrica, she has had no problems with significant weight gain, lower extremity edema, dizziness, irritability, or excessive daytime drowsiness.    RLS: Still on gabapentin 600 mg taken 2 hours before bed, which she has noticed is that at about 3 or 4 AM, she is awakening very awake, and has great difficulty falling back to sleep.  This happens less often if she takes this drug with her Lyrica a little bit later into the evening.  The drug does help her restlessness symptoms before she goes to bed.  With this she is tolerating it without issues of edema, dizziness, gait ataxia, rash, weight gain, etc.    Medical, surgical and family histories are reviewed in the electronic health record, there are no new drug allergies.  She is on the Lyrica and gabapentin as above, also Synthroid, Tresiba, baby aspirin, Zyrtec, Lexapro, NovoLog insulin, Layolis FE daily, Prilosec and vitamin D.    Review of Systems   Constitutional: Negative for malaise/fatigue.   Musculoskeletal: Negative for falls.   Neurological: Positive for sensory change. Negative for speech change and focal weakness.  "  Psychiatric/Behavioral: Negative for memory loss. The patient does not have insomnia.    All other systems reviewed and are negative.       Objective:     /62 (BP Location: Right arm, Patient Position: Sitting, BP Cuff Size: Adult)   Pulse 94   Temp 36.4 °C (97.5 °F) (Temporal)   Ht 1.727 m (5' 8\")   Wt 99.7 kg (219 lb 12.8 oz)   SpO2 94%   BMI 33.42 kg/m²      Physical Exam    She appears in no acute distress.  Vital signs are stable.  There is no malar rash.  The neck is supple.  Cardiac evaluation reveals a regular rhythm.  There is no lower extremity edema.    Mental status and cranial nerve exams are intact.    Musculoskeletal exam reveals normal tone, there is no tremor or drift.  Strength is intact in all 4 extremities.  Reflexes are diminished at the ankles, present elsewhere without obvious asymmetries.  There are no pathologic reflexes.    She stands easily, gait is normal and station and stride length.  Arm swing is symmetric.  There is no appendicular dystaxia, repetitive movements are intact and symmetric in all 4 extremities.    Sensory exam reveals only a mild stocking pattern decrement of vibration below the ankles.  Temperature is intact.     Assessment/Plan:     1. Diabetic polyneuropathy associated with type 1 diabetes mellitus (HCC)  Clinically her examination is stable, but the neurogenic pain symptoms are remaining a little problematic.  I recommended taking a third tablet of Lyrica 50 mg into the evenings to see if she can get some better benefit, possibly helping some of the rebound phenomena (see below) that may be part of the RLS itself.  She will call the office to update.  We will follow-up in about 1 year.    2. RLS (restless legs syndrome)  I suspect the awakenings in the early morning hours has as much to do with rebound phenomena as the gabapentin wears off as it may have to do with recurrence of her neuropathic symptoms.  If the elevated dose of Lyrica provides " suboptimal benefit, gabapentin will be changed to Horizant, a unique and longer acting formulation of gabapentin specifically indicated for RLS.  She will call the office to update in this regard.    - gabapentin (NEURONTIN) 600 MG tablet; Take 1 Tab by mouth   every evening.  Dispense: 90 Tab; Refill: 3    Time: 20-minute spent face-to-face for exam, review, discussion, and education, of this over 50% of the time spent counseling and coordinating care.

## 2021-01-21 ENCOUNTER — HOSPITAL ENCOUNTER (OUTPATIENT)
Dept: LAB | Facility: MEDICAL CENTER | Age: 36
End: 2021-01-21
Attending: NURSE PRACTITIONER
Payer: COMMERCIAL

## 2021-01-21 LAB
COVID ORDER STATUS COVID19: NORMAL
SARS-COV-2 RNA RESP QL NAA+PROBE: NOTDETECTED
SPECIMEN SOURCE: NORMAL

## 2021-01-21 PROCEDURE — U0005 INFEC AGEN DETEC AMPLI PROBE: HCPCS

## 2021-01-21 PROCEDURE — C9803 HOPD COVID-19 SPEC COLLECT: HCPCS

## 2021-01-21 PROCEDURE — U0003 INFECTIOUS AGENT DETECTION BY NUCLEIC ACID (DNA OR RNA); SEVERE ACUTE RESPIRATORY SYNDROME CORONAVIRUS 2 (SARS-COV-2) (CORONAVIRUS DISEASE [COVID-19]), AMPLIFIED PROBE TECHNIQUE, MAKING USE OF HIGH THROUGHPUT TECHNOLOGIES AS DESCRIBED BY CMS-2020-01-R: HCPCS

## 2021-03-16 DIAGNOSIS — E10.42 DIABETIC POLYNEUROPATHY ASSOCIATED WITH TYPE 1 DIABETES MELLITUS (HCC): ICD-10-CM

## 2021-03-16 NOTE — TELEPHONE ENCOUNTER
Received request via: Patient    Was the patient seen in the last year in this department? Yes    Does the patient have an active prescription (recently filled or refills available) for medication(s) requested? Yes

## 2021-03-17 RX ORDER — PREGABALIN 50 MG/1
CAPSULE ORAL
Qty: 270 CAPSULE | Refills: 3 | Status: SHIPPED | OUTPATIENT
Start: 2021-03-17 | End: 2021-09-20

## 2021-04-30 DIAGNOSIS — Z76.89 ENCOUNTER TO ESTABLISH CARE WITH NEW DOCTOR: ICD-10-CM

## 2021-04-30 DIAGNOSIS — E55.9 AVITAMINOSIS D: ICD-10-CM

## 2021-04-30 DIAGNOSIS — E03.9 HYPOTHYROIDISM, UNSPECIFIED TYPE: ICD-10-CM

## 2021-04-30 DIAGNOSIS — E10.42 TYPE 1 DIABETES MELLITUS WITH DIABETIC POLYNEUROPATHY (HCC): ICD-10-CM

## 2021-05-04 RX ORDER — LEVOTHYROXINE SODIUM 0.12 MG/1
TABLET ORAL
Qty: 90 TABLET | Refills: 0 | Status: SHIPPED | OUTPATIENT
Start: 2021-05-04 | End: 2021-08-03

## 2021-05-09 NOTE — TELEPHONE ENCOUNTER
Was the patient seen in the last year in this department? Yes     Does patient have an active prescription for medications requested? No     Received Request Via: Pharmacy      Pt met protocol?:No, OV 8/17, due for labs   Lab Results   Component Value Date/Time    HBA1C 12.2 07/12/2016 10:15 AM           Normal for race

## 2021-08-04 ENCOUNTER — TELEPHONE (OUTPATIENT)
Dept: MEDICAL GROUP | Facility: PHYSICIAN GROUP | Age: 36
End: 2021-08-04

## 2021-08-04 NOTE — TELEPHONE ENCOUNTER
I spoke to the pt this morning regarding the pharmacist message about the prescription refill for her Levothyroxine prescription. Pt is now due for follow up labs.; however; she stated that she left [facility] and now seeing diff. Provider at another clinic. She was advised to make sure she calls her pharmacy to update her current PCP on file for their record.

## 2021-09-17 DIAGNOSIS — E10.42 DIABETIC POLYNEUROPATHY ASSOCIATED WITH TYPE 1 DIABETES MELLITUS (HCC): ICD-10-CM

## 2021-09-20 RX ORDER — PREGABALIN 50 MG/1
CAPSULE ORAL
Qty: 270 CAPSULE | Refills: 0 | Status: SHIPPED | OUTPATIENT
Start: 2021-09-20 | End: 2022-05-24

## 2021-09-20 NOTE — TELEPHONE ENCOUNTER
Received request via: Pharmacy    Was the patient seen in the last year in this department? Yes    Does the patient have an active prescription (recently filled or refills available) for medication(s) requested? No

## 2021-10-04 ENCOUNTER — HOSPITAL ENCOUNTER (OUTPATIENT)
Facility: MEDICAL CENTER | Age: 36
End: 2021-10-04
Attending: PHYSICIAN ASSISTANT
Payer: COMMERCIAL

## 2021-10-04 ENCOUNTER — OFFICE VISIT (OUTPATIENT)
Dept: URGENT CARE | Facility: PHYSICIAN GROUP | Age: 36
End: 2021-10-04
Payer: COMMERCIAL

## 2021-10-04 VITALS
HEART RATE: 95 BPM | RESPIRATION RATE: 20 BRPM | BODY MASS INDEX: 32.89 KG/M2 | OXYGEN SATURATION: 94 % | WEIGHT: 217 LBS | TEMPERATURE: 97.4 F | HEIGHT: 68 IN | DIASTOLIC BLOOD PRESSURE: 80 MMHG | SYSTOLIC BLOOD PRESSURE: 138 MMHG

## 2021-10-04 DIAGNOSIS — R05.9 COUGH: ICD-10-CM

## 2021-10-04 DIAGNOSIS — R19.7 DIARRHEA, UNSPECIFIED TYPE: ICD-10-CM

## 2021-10-04 DIAGNOSIS — Z20.822 SUSPECTED COVID-19 VIRUS INFECTION: ICD-10-CM

## 2021-10-04 DIAGNOSIS — R09.81 CONGESTION OF NASAL SINUS: ICD-10-CM

## 2021-10-04 PROCEDURE — 99213 OFFICE O/P EST LOW 20 MIN: CPT | Mod: CS | Performed by: PHYSICIAN ASSISTANT

## 2021-10-04 PROCEDURE — U0003 INFECTIOUS AGENT DETECTION BY NUCLEIC ACID (DNA OR RNA); SEVERE ACUTE RESPIRATORY SYNDROME CORONAVIRUS 2 (SARS-COV-2) (CORONAVIRUS DISEASE [COVID-19]), AMPLIFIED PROBE TECHNIQUE, MAKING USE OF HIGH THROUGHPUT TECHNOLOGIES AS DESCRIBED BY CMS-2020-01-R: HCPCS

## 2021-10-04 PROCEDURE — U0005 INFEC AGEN DETEC AMPLI PROBE: HCPCS

## 2021-10-04 ASSESSMENT — ENCOUNTER SYMPTOMS
SINUS PAIN: 1
COUGH: 1
SORE THROAT: 0
SPUTUM PRODUCTION: 0
DIARRHEA: 1
FEVER: 0

## 2021-10-04 NOTE — PROGRESS NOTES
Subjective     Nita Jackman is a 36 y.o. female who presents with Sinus Pain (Sinus larissa x 5 days)    Medications:    • albuterol Aers  • aspirin EC Tbec  • cetirizine Tabs  • docusate sodium Caps  • escitalopram Tabs  • gabapentin  • ibuprofen Tabs  • insulin aspart Soln  • Layolis FE Chew  • levothyroxine Tabs  • Magnesium Oxide Tabs  • omeprazole  • pregabalin  • Tresiba FlexTouch Sopn  • Vitamin C Tabs  • vitamin D3 Tabs  • Zinc Caps    Allergies: Percocet [oxycodone-acetaminophen] and Vicodin [hydrocodone-acetaminophen]    Problem List: Nita Jackman does not have any pertinent problems on file.    Surgical History:  Past Surgical History:   Procedure Laterality Date   • PB LAP,APPENDECTOMY  7/29/2020    Procedure: APPENDECTOMY, LAPAROSCOPIC;  Surgeon: Mac Rouse M.D.;  Location: SURGERY [facility];  Service: General   • CARPAL TUNNEL ENDOSCOPIC  5/24/2011    Performed by WAYLON COLEMAN at SURGERY [facility] ORS   • VITRECTOMY POSTERIOR  5/4/2009    Performed by DARYL DASILVA at SURGERY SAME DAY [facility] ORS   • EYE CRYOSURGERY  5/4/2009    Performed by DARYL DASILVA at SURGERY SAME DAY [facility] ORS   • RETINAL DETACHMENT REPAIR  7/11/2008    left/right eye       Past Social Hx: Nita Jackman  reports that she has never smoked. She has never used smokeless tobacco. She reports current alcohol use of about 1.2 - 1.8 oz of alcohol per week. She reports that she does not use drugs.     Past Family Hx:  Nita Jackman family history includes Diabetes in an other family member; Hyperlipidemia in her father; Hypertension in an other family member; Stroke in an other family member.     Problem list, medications, and allergies reviewed by myself today in Epic.          Patient presents with:  Sinus Pain: Sinus congestion, diarrhea x 5 days.  Pt has been vaccinated against COVID-19 patient has been taking some over-the-counter cough cold type medications with a little relief.         URI   This  "is a new problem. The current episode started in the past 7 days. The problem has been gradually worsening. There has been no fever. Associated symptoms include congestion, coughing, diarrhea and sinus pain. Pertinent negatives include no sore throat. She has tried acetaminophen, decongestant and increased fluids for the symptoms. The treatment provided mild relief.       Review of Systems   Constitutional: Negative for fever.   HENT: Positive for congestion and sinus pain. Negative for sore throat.    Respiratory: Positive for cough. Negative for sputum production.    Gastrointestinal: Positive for diarrhea.   All other systems reviewed and are negative.             Objective     /80 (BP Location: Left arm, Patient Position: Sitting, BP Cuff Size: Adult long)   Pulse 95   Temp 36.3 °C (97.4 °F) (Temporal)   Resp 20   Ht 1.727 m (5' 8\")   Wt 98.4 kg (217 lb)   SpO2 94%   BMI 32.99 kg/m²      Physical Exam  Vitals and nursing note reviewed.   Constitutional:       General: She is not in acute distress.     Appearance: Normal appearance. She is well-developed and normal weight. She is not ill-appearing or toxic-appearing.   HENT:      Head: Normocephalic and atraumatic.      Right Ear: Tympanic membrane normal.      Left Ear: Tympanic membrane normal.      Nose: Congestion and rhinorrhea present.      Mouth/Throat:      Lips: Pink.      Mouth: Mucous membranes are moist.      Pharynx: Oropharynx is clear. Uvula midline. No posterior oropharyngeal erythema.   Eyes:      Extraocular Movements: Extraocular movements intact.      Conjunctiva/sclera: Conjunctivae normal.      Pupils: Pupils are equal, round, and reactive to light.   Cardiovascular:      Rate and Rhythm: Normal rate and regular rhythm.      Pulses: Normal pulses.      Heart sounds: Normal heart sounds.   Pulmonary:      Effort: Pulmonary effort is normal. No respiratory distress.      Breath sounds: Normal breath sounds. No rhonchi or rales. "   Abdominal:      General: Bowel sounds are normal.      Palpations: Abdomen is soft.   Musculoskeletal:         General: Normal range of motion.      Cervical back: Normal range of motion and neck supple.   Skin:     General: Skin is warm and dry.      Capillary Refill: Capillary refill takes less than 2 seconds.   Neurological:      General: No focal deficit present.      Mental Status: She is alert and oriented to person, place, and time.      Cranial Nerves: No cranial nerve deficit.      Motor: Motor function is intact.      Coordination: Coordination is intact.      Gait: Gait normal.   Psychiatric:         Mood and Affect: Mood normal.                       Assessment & Plan             1. Congestion of nasal sinus  COVID/SARS CoV-2 PCR   2. Cough  COVID/SARS CoV-2 PCR   3. Diarrhea, unspecified type  COVID/SARS CoV-2 PCR   4. Suspected COVID-19 virus infection  COVID/SARS CoV-2 PCR     Per protocol for PUI/ISO patients, the patient was evaluated by me while I was wearing PPE.  Per CDC guidelines, patient has been instructed to self quarantine at home until test results are known.  IF positive, pt to remain at home for 10 days from onset of symptoms.  If negative, pt to remain at home until fever has resolved.       PT can begin or continue OTC medications, increase fluids and rest until symptoms improve.     PT should follow up with PCP in 1-2 days for re-evaluation if symptoms have not improved.      Discussed red flags and reasons to return to UC or ED.      Pt and/or family verbalized understanding of diagnosis and follow up instructions and was offered informational handout on diagnosis.  PT discharged.

## 2021-10-12 NOTE — TELEPHONE ENCOUNTER
[address] [facility]  Dept: [phone]  Dept Fax:[phone]    Carey Astudillo is a 60 y.o. male who presents today for his medical conditions/complaints as noted below. Carey Astudillo is c/o of Hypertension (Urinary hesitancy, Hyperlipidemia- last lipids 4/27/2021. He would like rx for Cardura 2mg #90 with 1 refill (in addition to 4mg))      HPI:     HPI  Here for follow up of urinary hesitancy and HTN  Taking all medications regularly  No side effects noted    No other complaint currently has titrated dose 2 mg with fair control unless missing doses. Had prior script from Dr Jorge Alberto Craft for trazodone to use as needed for sleep. Using as needed had script 50 mg from past 18 months. Prior reported used something as child. Had prior [place] not helpful. Reports feeling mind racing often and anxious. Sleeping 5-7 hours per night. Felt may have had a stroke with left face and left hand felt numb while driving home. Episode lasted under an hour. Used an aspirin and beer at home and seemed to improve.       BP Readings from Last 3 Encounters:   10/12/21 130/70   07/12/21 114/70   07/06/21 111/72          (goal 120/80)    Past Medical History:   Diagnosis Date    Essential hypertension     Mixed hyperlipidemia       Past Surgical History:   Procedure Laterality Date    CARPAL TUNNEL RELEASE Right 04/2018    COLONOSCOPY  02/2015    diverticulosis, hyperplastic polyp    COLONOSCOPY N/A 7/6/2021    COLONOSCOPY WITH BIOPSY performed by Carlene Wilson MD at [address] Left 01/2016    SHOULDER SURGERY      TONSILLECTOMY  1981    [facility]       Family History   Problem Relation Age of Onset    Lung Cancer Mother         brain mets    Brain Cancer Father     Stroke Maternal Grandfather     Stroke Paternal Grandfather [de-identified]       Social History     Tobacco Use    Smoking status: Former Smoker Was the patient seen in the last year in this department? Yes    Does patient have an active prescription for medications requested? Yes    Received Request Via: Patient      Called patient about dosing of the Pregabalin (Lyrica) 50mg capsule   She states that she is now taking 1 tab in the Morning  And 2 tabs in the PM.    Packs/day: 0.50     Years: 20.00     Pack years: 10.00     Types: Cigarettes     Start date: 1982     Quit date: 2005     Years since quittin.7    Smokeless tobacco: Never Used   Substance Use Topics    Alcohol use: Not Currently      Current Outpatient Medications   Medication Sig Dispense Refill    Multiple Vitamins-Minerals (THERAPEUTIC MULTIVITAMIN-MINERALS) tablet Take 1 tablet by mouth daily      aspirin 81 MG EC tablet Take 81 mg by mouth daily      traZODone (DESYREL) 50 MG tablet TAKE 1/2 TO 1 (ONE HALF TO ONE) TABLET BY MOUTH AT BEDTIME FOR SLEEP 90 tablet 1    doxazosin (CARDURA) 2 MG tablet Take 1 tablet by mouth nightly 90 tablet 3    citalopram (CELEXA) 10 MG tablet Take 1 tablet by mouth daily 90 tablet 1     No current facility-administered medications for this visit. Allergies   Allergen Reactions    Penicillins Rash     Patient does not know. Health Maintenance   Topic Date Due    HIV screen  Never done    Flu vaccine (1) Never done    COVID-19 Vaccine (1) 2025 (Originally 1975)    Shingles Vaccine (1 of 2) 2030 (Originally 2012)    Potassium monitoring  2022    Creatinine monitoring  2022    Lipid screen  2026    DTaP/Tdap/Td vaccine (2 - Td or Tdap) 2029    Colon cancer screen colonoscopy  2031    Hepatitis C screen  Completed    Hepatitis A vaccine  Aged Out    Hepatitis B vaccine  Aged Out    Hib vaccine  Aged Out    Meningococcal (ACWY) vaccine  Aged Out    Pneumococcal 0-64 years Vaccine  Aged Out       Subjective:      Review of Systems   Constitutional: Positive for fatigue. Negative for appetite change. HENT: Negative for congestion and sinus pressure. Eyes: Negative for visual disturbance. Respiratory: Negative for cough and shortness of breath. Cardiovascular: Negative for chest pain. Gastrointestinal: Negative for constipation and diarrhea.    Genitourinary: Negative for difficulty urinating and dysuria. Cardura helps hesitancy   Musculoskeletal:        Left knee pain. Neurological: Negative for dizziness and headaches. One month ago he had an episode where left side went numb (arm and face)   Psychiatric/Behavioral: Negative for sleep disturbance. Home BP Checks? yes  Medication Compliant? yes    Objective:     /70 (Site: Right Upper Arm, Position: Sitting, Cuff Size: Large Adult)   Pulse 84   Temp 98.6 °F (37 °C)   Resp 20   Wt 263 lb 12.8 oz (119.7 kg)   SpO2 95%   BMI 32.11 kg/m²   Physical Exam  Vitals reviewed. Constitutional:       General: He is not in acute distress. Appearance: He is well-developed. HENT:      Head: Atraumatic. Eyes:      Conjunctiva/sclera: Conjunctivae normal.   Neck:      Thyroid: No thyromegaly. Vascular: No carotid bruit. Cardiovascular:      Rate and Rhythm: Normal rate and regular rhythm. Heart sounds: No murmur heard. Pulmonary:      Effort: Pulmonary effort is normal.      Breath sounds: Normal breath sounds. Abdominal:      General: Bowel sounds are normal.      Palpations: Abdomen is soft. Musculoskeletal:         General: No swelling (BLE). Cervical back: Neck supple. Right lower leg: No swelling. Left lower leg: No swelling. Lymphadenopathy:      Cervical: No cervical adenopathy. Neurological:      Mental Status: He is alert and oriented to person, place, and time. Psychiatric:         Thought Content: Thought content normal.         Judgment: Judgment normal.           Assessment:      1. Essential hypertension    2. Urinary hesitancy    3. Anxiety    4. Sleep disturbance    5. Chronic pain of left knee             Plan:     There are no Patient Instructions on file for this visit. No orders of the defined types were placed in this encounter.     Orders Placed This Encounter   Medications    traZODone (DESYREL) 50 MG tablet     Sig: TAKE 1/2 TO 1 (ONE HALF TO ONE) TABLET BY MOUTH AT BEDTIME FOR SLEEP     Dispense:  90 tablet     Refill:  1    doxazosin (CARDURA) 2 MG tablet     Sig: Take 1 tablet by mouth nightly     Dispense:  90 tablet     Refill:  3    citalopram (CELEXA) 10 MG tablet     Sig: Take 1 tablet by mouth daily     Dispense:  90 tablet     Refill:  1        Return in about 6 months (around 4/12/2022) for BPH, HTN, anxiety. Discussed use, benefit, and side effects of prescribed medications. All patient questions answered. Pt voiced understanding. Reviewed health maintenance flu vaccine reviewed. Instructed to continue current medications, diet and exercise. Patient agreed with treatment plan. Follow up as directed.      Electronically signedby Fifi Jeff MD on 10/12/2021

## 2021-11-12 DIAGNOSIS — E10.42 DIABETIC POLYNEUROPATHY ASSOCIATED WITH TYPE 1 DIABETES MELLITUS (HCC): ICD-10-CM

## 2021-11-16 RX ORDER — GABAPENTIN 600 MG/1
TABLET ORAL
Qty: 90 TABLET | Refills: 3 | Status: SHIPPED | OUTPATIENT
Start: 2021-11-16 | End: 2022-11-21 | Stop reason: SDUPTHER

## 2022-03-28 ENCOUNTER — OFFICE VISIT (OUTPATIENT)
Dept: URGENT CARE | Facility: PHYSICIAN GROUP | Age: 37
End: 2022-03-28
Payer: COMMERCIAL

## 2022-03-28 VITALS
TEMPERATURE: 97.6 F | BODY MASS INDEX: 33.89 KG/M2 | SYSTOLIC BLOOD PRESSURE: 134 MMHG | WEIGHT: 223.6 LBS | HEIGHT: 68 IN | DIASTOLIC BLOOD PRESSURE: 96 MMHG | HEART RATE: 92 BPM | OXYGEN SATURATION: 97 % | RESPIRATION RATE: 16 BRPM

## 2022-03-28 DIAGNOSIS — L03.311 CELLULITIS, ABDOMINAL WALL: ICD-10-CM

## 2022-03-28 PROCEDURE — 99213 OFFICE O/P EST LOW 20 MIN: CPT | Performed by: FAMILY MEDICINE

## 2022-03-28 RX ORDER — SULFAMETHOXAZOLE AND TRIMETHOPRIM 800; 160 MG/1; MG/1
1 TABLET ORAL 2 TIMES DAILY
Qty: 10 TABLET | Refills: 0 | Status: SHIPPED | OUTPATIENT
Start: 2022-03-28 | End: 2022-04-02

## 2022-03-28 RX ORDER — CEPHALEXIN 500 MG/1
500 CAPSULE ORAL 4 TIMES DAILY
Qty: 20 CAPSULE | Refills: 0 | Status: SHIPPED | OUTPATIENT
Start: 2022-03-28 | End: 2022-04-02

## 2022-03-28 ASSESSMENT — ENCOUNTER SYMPTOMS
SORE THROAT: 0
SHORTNESS OF BREATH: 0
MYALGIAS: 0
DIZZINESS: 0
COUGH: 0
FEVER: 0
NAUSEA: 0
CHILLS: 0
VOMITING: 0

## 2022-03-28 NOTE — PROGRESS NOTES
DaysSubjective:   Nita Jackman is a 36 y.o. female who presents for Other (Pt has cellulitis on (L) abdomin x 1 week)        Rash  Chronicity: Reports redness, swelling abdominal wall at site of insulin pump insertion site. Episode onset: 1 week. The problem is unchanged. Location: Abdominal wall. The rash is characterized by redness, swelling and pain. Associated with: Insulin pump injection site. Pertinent negatives include no cough, fever, shortness of breath, sore throat or vomiting. Treatments tried: Routine skin hygiene. The treatment provided mild relief.     PMH:  has a past medical history of Asthma, Diabetes, Retinopathy due to secondary diabetes (HCC), and Thyroid disorder.  MEDS:   Current Outpatient Medications:   •  cephALEXin (KEFLEX) 500 MG Cap, Take 1 Capsule by mouth 4 times a day for 5 days., Disp: 20 Capsule, Rfl: 0  •  sulfamethoxazole-trimethoprim (BACTRIM DS) 800-160 MG tablet, Take 1 Tablet by mouth 2 times a day for 5 days., Disp: 10 Tablet, Rfl: 0  •  gabapentin (NEURONTIN) 600 MG tablet, TAKE 1 TABLET BY MOUTH EVERY EVENING, Disp: 90 Tablet, Rfl: 3  •  levothyroxine (SYNTHROID) 125 MCG Tab, TAKE 1 TABLET BY MOUTH EVERY MORNING ON AN EMPTY STOMACH, Disp: 30 tablet, Rfl: 0  •  escitalopram (LEXAPRO) 10 MG Tab, Take 10 mg by mouth every day., Disp: , Rfl:   •  insulin aspart (NOVOLOG) 100 UNIT/ML Solution, Inject 1-20 Units under the skin 3 times a day before meals. 1 units for every 10 carb, Disp: , Rfl:   •  omeprazole (PRILOSEC) 40 MG delayed-release capsule, Take 40 mg by mouth every day., Disp: , Rfl:   •  albuterol 108 (90 Base) MCG/ACT Aero Soln inhalation aerosol, Inhale 2 Puffs by mouth every four hours as needed for Shortness of Breath., Disp: 1 Inhaler, Rfl: 3  ALLERGIES:   Allergies   Allergen Reactions   • Hydrocodone-Acetaminophen    • Oxycodone-Acetaminophen    • Percocet [Oxycodone-Acetaminophen] Itching     And nausea   • Vicodin [Hydrocodone-Acetaminophen] Vomiting  "    SURGHX:   Past Surgical History:   Procedure Laterality Date   • AL LAP,APPENDECTOMY  7/29/2020    Procedure: APPENDECTOMY, LAPAROSCOPIC;  Surgeon: Mac Rouse M.D.;  Location: SURGERY [facility];  Service: General   • CARPAL TUNNEL ENDOSCOPIC  5/24/2011    Performed by WAYLON COLEMAN at SURGERY [facility] ORS   • VITRECTOMY POSTERIOR  5/4/2009    Performed by DARYL DASILVA at SURGERY SAME DAY [facility] ORS   • EYE CRYOSURGERY  5/4/2009    Performed by DARYL DASILVA at SURGERY SAME DAY [facility] ORS   • RETINAL DETACHMENT REPAIR  7/11/2008    left/right eye     SOCHX:  reports that she has never smoked. She has never used smokeless tobacco. She reports current alcohol use of about 1.2 - 1.8 oz of alcohol per week. She reports that she does not use drugs.  FH:   Family History   Problem Relation Age of Onset   • Hyperlipidemia Father    • Diabetes Other    • Hypertension Other    • Stroke Other      Review of Systems   Constitutional: Negative for chills and fever.   HENT: Negative for sore throat.    Respiratory: Negative for cough and shortness of breath.    Gastrointestinal: Negative for nausea and vomiting.   Musculoskeletal: Negative for myalgias.   Skin: Positive for rash.   Neurological: Negative for dizziness.        Objective:   /96 (BP Location: Right arm, Patient Position: Sitting, BP Cuff Size: Large adult)   Pulse 92   Temp 36.4 °C (97.6 °F) (Temporal)   Resp 16   Ht 1.727 m (5' 8\")   Wt 101 kg (223 lb 9.6 oz)   SpO2 97%   BMI 34.00 kg/m²   Physical Exam  Vitals and nursing note reviewed.   Constitutional:       General: She is not in acute distress.     Appearance: She is well-developed.   HENT:      Head: Normocephalic and atraumatic.      Right Ear: External ear normal.      Left Ear: External ear normal.      Nose: Nose normal.      Mouth/Throat:      Mouth: Mucous membranes are moist.   Eyes:      Conjunctiva/sclera: Conjunctivae normal.   Cardiovascular:      Rate " and Rhythm: Normal rate.   Pulmonary:      Effort: Pulmonary effort is normal. No respiratory distress.      Breath sounds: Normal breath sounds.   Abdominal:      General: There is no distension.       Musculoskeletal:         General: Normal range of motion.   Skin:     General: Skin is warm and dry.   Neurological:      General: No focal deficit present.      Mental Status: She is alert and oriented to person, place, and time. Mental status is at baseline.      Gait: Gait (gait at baseline) normal.   Psychiatric:         Judgment: Judgment normal.           Assessment/Plan:   1. Cellulitis, abdominal wall  - cephALEXin (KEFLEX) 500 MG Cap; Take 1 Capsule by mouth 4 times a day for 5 days.  Dispense: 20 Capsule; Refill: 0  - sulfamethoxazole-trimethoprim (BACTRIM DS) 800-160 MG tablet; Take 1 Tablet by mouth 2 times a day for 5 days.  Dispense: 10 Tablet; Refill: 0        Medical Decision Making/Course:  In the course of preparing for this visit with review of the pertinent past medical history, recent and past clinic visits, current medications, and performing chart, immunization, medical history and medication reconciliation, and in the further course of obtaining the current history pertinent to the clinic visit today, performing an exam and evaluation, ordering and independently evaluating labs, tests  , and/or procedures, prescribing any recommended new medications as noted above, providing any pertinent counseling and education and recommending further coordination of care including symptomatic supportive measures, recommendations to return for any persistent or worsening symptoms, at least  15 minutes of total time were spent during this encounter.      Discussed close monitoring, and to follow-up with primary care provider for any persistent or worsening symptoms precautions, and supportive measures of maintaining adequate fluid hydration and caloric intake, relative rest and symptom management as needed  for pain and/or fever.    Differential diagnosis, natural history, supportive care, and indications for immediate follow-up discussed.     Advised the patient to follow-up with the primary care physician for recheck, reevaluation, and consideration of further management.    Please note that this dictation was created using voice recognition software. I have worked with consultants from the vendor as well as technical experts from [facility] to optimize the interface. I have made every reasonable attempt to correct obvious errors, but I expect that there are errors of grammar and possibly content that I did not discover before finalizing the note.

## 2022-05-25 ENCOUNTER — OFFICE VISIT (OUTPATIENT)
Dept: NEUROLOGY | Facility: MEDICAL CENTER | Age: 37
End: 2022-05-25
Attending: PSYCHIATRY & NEUROLOGY
Payer: COMMERCIAL

## 2022-05-25 VITALS
OXYGEN SATURATION: 98 % | WEIGHT: 222.44 LBS | BODY MASS INDEX: 33.71 KG/M2 | TEMPERATURE: 97.9 F | DIASTOLIC BLOOD PRESSURE: 78 MMHG | SYSTOLIC BLOOD PRESSURE: 136 MMHG | HEIGHT: 68 IN | HEART RATE: 85 BPM

## 2022-05-25 DIAGNOSIS — G25.81 RLS (RESTLESS LEGS SYNDROME): ICD-10-CM

## 2022-05-25 DIAGNOSIS — E10.42 DIABETIC POLYNEUROPATHY ASSOCIATED WITH TYPE 1 DIABETES MELLITUS (HCC): ICD-10-CM

## 2022-05-25 PROCEDURE — 99214 OFFICE O/P EST MOD 30 MIN: CPT | Performed by: PSYCHIATRY & NEUROLOGY

## 2022-05-25 PROCEDURE — 99212 OFFICE O/P EST SF 10 MIN: CPT | Performed by: PSYCHIATRY & NEUROLOGY

## 2022-05-25 RX ORDER — CLONIDINE 0.1 MG/24H
1 PATCH, EXTENDED RELEASE TRANSDERMAL
Qty: 4 PATCH | Refills: 0 | Status: SHIPPED | OUTPATIENT
Start: 2022-05-25 | End: 2022-06-03 | Stop reason: SDUPTHER

## 2022-05-25 ASSESSMENT — PATIENT HEALTH QUESTIONNAIRE - PHQ9: CLINICAL INTERPRETATION OF PHQ2 SCORE: 0

## 2022-05-25 ASSESSMENT — ENCOUNTER SYMPTOMS
SENSORY CHANGE: 1
FALLS: 0
MEMORY LOSS: 0
TINGLING: 1
FOCAL WEAKNESS: 0

## 2022-05-25 NOTE — PROGRESS NOTES
Subjective     Nita Jackman is a 37 y.o. female who presents for follow-up, last seen in January, 2021, with a history of a painful diabetic polyneuropathy and symptomatic RLS.    HPI    Neuropathy: Nita states that the nerve pain symptoms in the feet remain, but at a slightly increased level.  It is still the lateral aspect of the left foot that is most problematic, the jabbing sensations the longer she walks and weightbears still problematic.  They can also awaken her from sleep.  She has no sensory symptoms in the hands or fingers.  The constant paresthesias in both feet along the soles also become problematic and longer she is weightbearing.    Originally on Lyrica 50 mg in the morning and 100 mg in the evening, she had not noted significant weight changes when we were last talking about it, but she has had problems losing the weight she put on.  She is cut the drug down to a single 50 mg dose every day, there has been no real change in her symptoms, the drug did toned down the severity of the stabbing pains when they occurred.  The drug also clouded her sensorium significantly, mental fog has lifted to a great degree.    RLS: Still on the gabapentin 600 mg, 2 hours before bed, she is sleeping through the night a little bit more effectively.  She was having some rebound phenomena in the early morning hours, this seems to have attenuated.  She has been on this drug for quite a while, before she had put on the wait she is now complaining of.    Medical, surgical and family histories are reviewed, there are no new drug allergies.  Her glycemic control is improving, she now has a CGM placed and is back on the insulin pump.  She is on gabapentin 600 mg 2 hours before bed, Lyrica 50 mg daily, Synthroid, NovoLog insulin, Prilosec and Lexapro.    Review of Systems   Constitutional: Negative for malaise/fatigue.   Musculoskeletal: Negative for falls.   Neurological: Positive for tingling and sensory change. Negative  "for focal weakness.   Psychiatric/Behavioral: Negative for memory loss.   All other systems reviewed and are negative.    Objective     /78 (BP Location: Right arm, Patient Position: Sitting, BP Cuff Size: Adult)   Pulse 85   Temp 36.6 °C (97.9 °F) (Temporal)   Ht 1.727 m (5' 8\")   Wt 101 kg (222 lb 7.1 oz)   SpO2 98%   BMI 33.82 kg/m²      Physical Exam     She appears in no acute distress.  Vital signs are stable. There is no malar rash.  The neck is supple, range of motion is full.  Cardiac evaluation is unremarkable.     Neurological Exam    Fully oriented, there is no aphasia or inattention.    PERRLA/EOMI, visual fields are full, facial movements are symmetric, there is no bulbar dysfunction.    Musculoskeletal exam reveals normal tone without tremor or drift.  Strength is intact throughout.    She walks easily, armswing is symmetric, gait is normal and station and stride length.  There is no appendicular dystaxia.  We will stop Lyrica completely, she was told it may take weeks or even a month or more for her to lose    Assessment & Plan     1. Diabetic polyneuropathy associated with type 1 diabetes mellitus (HCC)  The weight she has put on will come off as soon as she discontinues the Lyrica.  In the meantime Catapres-TTS 0.1 mg patch will be started on a weekly basis.  Side effects were reviewed.  She does have quite a few other options left to help treat her symptoms, a lot of which are still weight neutral.    - cloNIDine (CATAPRES) 0.1 MG/24HR PATCH WEEKLY patch; Place 1 Patch on the skin every 7 days.  Dispense: 4 Patch; Refill: 0    2. RLS (restless legs syndrome)  Also problematic, though the improvement with out changing her gabapentin regimen certainly is not going to be argued with.  We will continue her unchanged for now, there is still Horizant, a name brand formulation and variation of gabapentin which has a longer half-life.  We will follow-up in 6 months, communication via [employer] " in the interim.    Time: 30 minutes in total spent in patient care including free charting, record review, discussion with healthcare staff and documentation.  This includes face-to-face time with the patient for exam, review, discussion, as well as counseling and coordinating care.

## 2022-06-03 DIAGNOSIS — E10.42 DIABETIC POLYNEUROPATHY ASSOCIATED WITH TYPE 1 DIABETES MELLITUS (HCC): ICD-10-CM

## 2022-06-03 RX ORDER — CLONIDINE 0.1 MG/24H
1 PATCH, EXTENDED RELEASE TRANSDERMAL
Qty: 4 PATCH | Refills: 4 | Status: SHIPPED | OUTPATIENT
Start: 2022-06-03 | End: 2022-11-02 | Stop reason: CLARIF

## 2022-06-28 ENCOUNTER — OFFICE VISIT (OUTPATIENT)
Dept: URGENT CARE | Facility: PHYSICIAN GROUP | Age: 37
End: 2022-06-28
Payer: COMMERCIAL

## 2022-06-28 VITALS
TEMPERATURE: 97.1 F | HEART RATE: 86 BPM | OXYGEN SATURATION: 95 % | DIASTOLIC BLOOD PRESSURE: 60 MMHG | RESPIRATION RATE: 16 BRPM | HEIGHT: 68 IN | SYSTOLIC BLOOD PRESSURE: 102 MMHG | WEIGHT: 214 LBS | BODY MASS INDEX: 32.43 KG/M2

## 2022-06-28 DIAGNOSIS — J06.9 UPPER RESPIRATORY TRACT INFECTION, UNSPECIFIED TYPE: ICD-10-CM

## 2022-06-28 DIAGNOSIS — E10.42 DIABETIC POLYNEUROPATHY ASSOCIATED WITH TYPE 1 DIABETES MELLITUS (HCC): ICD-10-CM

## 2022-06-28 LAB
FLUAV+FLUBV AG SPEC QL IA: NEGATIVE
INT CON NEG: NEGATIVE
INT CON POS: POSITIVE

## 2022-06-28 PROCEDURE — 87804 INFLUENZA ASSAY W/OPTIC: CPT | Performed by: STUDENT IN AN ORGANIZED HEALTH CARE EDUCATION/TRAINING PROGRAM

## 2022-06-28 PROCEDURE — 99213 OFFICE O/P EST LOW 20 MIN: CPT | Performed by: STUDENT IN AN ORGANIZED HEALTH CARE EDUCATION/TRAINING PROGRAM

## 2022-06-28 RX ORDER — CLONIDINE 0.2 MG/24H
1 PATCH, EXTENDED RELEASE TRANSDERMAL
Qty: 4 PATCH | Refills: 1 | Status: SHIPPED | OUTPATIENT
Start: 2022-06-28 | End: 2022-08-28

## 2022-06-28 RX ORDER — CETIRIZINE HYDROCHLORIDE 10 MG/1
CAPSULE, LIQUID FILLED ORAL
COMMUNITY

## 2022-06-28 NOTE — PROGRESS NOTES
Subjective:   Nita Jackman is a 37 y.o. female who presents for Nasal Congestion (Onset 3 days.), Cough (Dry. Onset 2 days. 4 Negative at home COVID tests. Recent exposure to flu A), and Pharyngitis (Onset 1 day.)      HPI:  Pleasant 29-year-old female presents to clinic for 3 days of nasal congestion, dry cough, and sore throat.  Patient states that she was exposed to influenza A recently.  She took 4 at home COVID tests on different days with negative results for all 4 days.  She is able to maintain adequate oral intake of fluids and solids.  She states that she would like to get influenza testing at this time.  She reports that her sore throat is about a 0 out of 10 and is feeling pretty good right now.  She states it is worse in the morning when her throat is dry.  Patient denies fever, chills, diaphoresis, rash, ear pain, ear discharge, sinus pain, sinus pressure, eye discharge, eye redness, chest pain, palpitations, lower leg swelling, sputum production, shortness of breath, wheezing, nausea, vomiting, abdominal pain, diarrhea, dizziness, myalgias, or headache.      Medications:    • albuterol Aers  • cloNIDine Ptwk  • escitalopram Tabs  • gabapentin  • insulin aspart Soln  • levothyroxine Tabs  • omeprazole  • ZyrTEC Allergy Caps    Allergies: Hydrocodone-acetaminophen, Oxycodone-acetaminophen, Percocet [oxycodone-acetaminophen], and Vicodin [hydrocodone-acetaminophen]    Problem List: Nita Jackman does not have any pertinent problems on file.    Surgical History:  Past Surgical History:   Procedure Laterality Date   • [place] LAP,APPENDECTOMY  7/29/2020    Procedure: APPENDECTOMY, LAPAROSCOPIC;  Surgeon: Mac Rouse M.D.;  Location: SURGERY [facility];  Service: General   • CARPAL TUNNEL ENDOSCOPIC  5/24/2011    Performed by WAYLON COLEMAN at SURGERY [facility] ORS   • VITRECTOMY POSTERIOR  5/4/2009    Performed by DARYL DASILVA at SURGERY SAME DAY [facility] ORS   • EYE CRYOSURGERY  5/4/2009     "Performed by DARYL DASILVA at SURGERY SAME DAY [facility] ORS   • RETINAL DETACHMENT REPAIR  7/11/2008    left/right eye       Past Social Hx: Nita Jackman  reports that she has never smoked. She has never used smokeless tobacco. She reports current alcohol use of about 1.2 - 1.8 oz of alcohol per week. She reports current drug use. Frequency: 1.00 time per week. Drug: Marijuana.     Past Family Hx:  Nita Jackman family history includes Diabetes in an other family member; Hyperlipidemia in her father; Hypertension in an other family member; Stroke in an other family member.     Problem list, medications, and allergies reviewed by myself today in Epic.     Objective:     /60 (BP Location: Right arm, Patient Position: Sitting, BP Cuff Size: Large adult)   Pulse 86   Temp 36.2 °C (97.1 °F) (Temporal)   Resp 16   Ht 1.727 m (5' 8\")   Wt 97.1 kg (214 lb)   SpO2 95%   BMI 32.54 kg/m²     Physical Exam  Vitals reviewed.   Constitutional:       General: She is not in acute distress.     Appearance: Normal appearance.   HENT:      Head: Normocephalic.      Right Ear: Hearing, tympanic membrane, ear canal and external ear normal.      Left Ear: Hearing, tympanic membrane, ear canal and external ear normal.      Nose: Congestion present. No rhinorrhea.      Mouth/Throat:      Mouth: Mucous membranes are moist.      Pharynx: Oropharynx is clear. Uvula midline. Posterior oropharyngeal erythema present. No oropharyngeal exudate or uvula swelling.      Tonsils: No tonsillar exudate or tonsillar abscesses.      Comments: No tonsillar swelling bilaterally.  Eyes:      General:         Right eye: No discharge.         Left eye: No discharge.      Conjunctiva/sclera: Conjunctivae normal.      Pupils: Pupils are equal, round, and reactive to light.   Cardiovascular:      Rate and Rhythm: Normal rate and regular rhythm.      Pulses: Normal pulses.      Heart sounds: Normal heart sounds. No murmur heard.  Pulmonary: "      Effort: Pulmonary effort is normal. No respiratory distress.      Breath sounds: Normal breath sounds. No stridor. No wheezing, rhonchi or rales.   Musculoskeletal:      Cervical back: Normal range of motion.   Lymphadenopathy:      Cervical: No cervical adenopathy.   Skin:     General: Skin is warm and dry.      Capillary Refill: Capillary refill takes less than 2 seconds.      Findings: No erythema, lesion or rash.   Neurological:      General: No focal deficit present.      Mental Status: She is alert and oriented to person, place, and time.         Lab Results/POC Test Results   Results for orders placed or performed in visit on 06/28/22   POCT Influenza A/B   Result Value Ref Range    Rapid Influenza A-B Negative     Internal Control Positive Positive     Internal Control Negative Negative            Assessment/Plan:     Diagnosis and associated orders:     1. Upper respiratory tract infection, unspecified type  POCT Influenza A/B      Comments/MDM:     • Patient's presentation physical exam findings are consistent with unspecified URI.  POCT influenza A/B was negative.  Patient took 4 at home COVID tests on consecutive days and all were negative.  Patient is able to maintain adequate oral intake of fluids and solids.  Patient's vitals are within normal limits and she is afebrile.  Patient stable this time and appropriate for home supportive care.  She has been using DayQuil/NyQuil, Mucinex, and ibuprofen.  She states that she is able to manage her symptoms while at home but want to make sure that she did not have influenza.  Patient was advised to continue with her current home supportive care.  She may also use warm salt water gargles, home humidifier, nasal saline spray, plenty of oral hydration, and warm fluids such as tea with honey.  Patient was advised to use her Flonase that she has at home in order to open up her sinuses and drain her congestion.  • Patient would not like PCR COVID testing at this  time.  Patient centor score shows a very low likelihood of strep pharyngitis.  • ED/return precautions were given.  Patient has good understanding of the signs and symptoms that warrant immediate reevaluation.         Differential diagnosis, natural history, supportive care, and indications for immediate follow-up discussed.    Advised the patient to follow-up with the primary care physician for recheck, reevaluation, and consideration of further management.    Please note that this dictation was created using voice recognition software. I have made a reasonable attempt to correct obvious errors, but I expect that there are errors of grammar and possibly content that I did not discover before finalizing the note.    Electronically signed by Humberto Vital PA-C.

## 2022-08-28 DIAGNOSIS — E10.42 DIABETIC POLYNEUROPATHY ASSOCIATED WITH TYPE 1 DIABETES MELLITUS (HCC): ICD-10-CM

## 2022-08-28 RX ORDER — CLONIDINE 0.3 MG/24H
1 PATCH, EXTENDED RELEASE TRANSDERMAL
Qty: 4 PATCH | Refills: 2 | Status: SHIPPED | OUTPATIENT
Start: 2022-08-28 | End: 2022-09-01 | Stop reason: SDUPTHER

## 2022-09-01 DIAGNOSIS — E10.42 DIABETIC POLYNEUROPATHY ASSOCIATED WITH TYPE 1 DIABETES MELLITUS (HCC): ICD-10-CM

## 2022-09-05 RX ORDER — CLONIDINE 0.3 MG/24H
1 PATCH, EXTENDED RELEASE TRANSDERMAL
Qty: 4 PATCH | Refills: 2 | Status: SHIPPED | OUTPATIENT
Start: 2022-09-05 | End: 2022-11-02 | Stop reason: CLARIF

## 2022-09-24 ENCOUNTER — OFFICE VISIT (OUTPATIENT)
Dept: URGENT CARE | Facility: PHYSICIAN GROUP | Age: 37
End: 2022-09-24
Payer: COMMERCIAL

## 2022-09-24 ENCOUNTER — HOSPITAL ENCOUNTER (OUTPATIENT)
Facility: MEDICAL CENTER | Age: 37
End: 2022-09-24
Attending: PHYSICIAN ASSISTANT
Payer: COMMERCIAL

## 2022-09-24 VITALS
SYSTOLIC BLOOD PRESSURE: 118 MMHG | HEART RATE: 94 BPM | BODY MASS INDEX: 31.83 KG/M2 | TEMPERATURE: 98 F | WEIGHT: 210 LBS | OXYGEN SATURATION: 95 % | HEIGHT: 68 IN | DIASTOLIC BLOOD PRESSURE: 62 MMHG | RESPIRATION RATE: 18 BRPM

## 2022-09-24 DIAGNOSIS — N30.01 ACUTE CYSTITIS WITH HEMATURIA: ICD-10-CM

## 2022-09-24 DIAGNOSIS — M79.18 CERVICAL MYOFASCIAL PAIN SYNDROME: ICD-10-CM

## 2022-09-24 LAB
APPEARANCE UR: NORMAL
BILIRUB UR STRIP-MCNC: NORMAL MG/DL
COLOR UR AUTO: NORMAL
GLUCOSE UR STRIP.AUTO-MCNC: 250 MG/DL
INT CON NEG: NEGATIVE
INT CON POS: POSITIVE
KETONES UR STRIP.AUTO-MCNC: NORMAL MG/DL
LEUKOCYTE ESTERASE UR QL STRIP.AUTO: NEGATIVE
NITRITE UR QL STRIP.AUTO: POSITIVE
PH UR STRIP.AUTO: 5 [PH] (ref 5–8)
POC URINE PREGNANCY TEST: NEGATIVE
PROT UR QL STRIP: 100 MG/DL
RBC UR QL AUTO: NEGATIVE
SP GR UR STRIP.AUTO: >=1.03
UROBILINOGEN UR STRIP-MCNC: 4 MG/DL

## 2022-09-24 PROCEDURE — 87086 URINE CULTURE/COLONY COUNT: CPT

## 2022-09-24 PROCEDURE — 99214 OFFICE O/P EST MOD 30 MIN: CPT | Performed by: PHYSICIAN ASSISTANT

## 2022-09-24 PROCEDURE — 81002 URINALYSIS NONAUTO W/O SCOPE: CPT | Performed by: PHYSICIAN ASSISTANT

## 2022-09-24 PROCEDURE — 81025 URINE PREGNANCY TEST: CPT | Performed by: PHYSICIAN ASSISTANT

## 2022-09-24 RX ORDER — TIZANIDINE 4 MG/1
4 TABLET ORAL EVERY 6 HOURS PRN
Qty: 30 TABLET | Refills: 0 | Status: SHIPPED | OUTPATIENT
Start: 2022-09-24 | End: 2022-11-17

## 2022-09-24 RX ORDER — CLONIDINE 0.2 MG/24H
PATCH, EXTENDED RELEASE TRANSDERMAL
COMMUNITY
End: 2022-11-02 | Stop reason: CLARIF

## 2022-09-24 RX ORDER — PREGABALIN 50 MG/1
CAPSULE ORAL
COMMUNITY
End: 2022-11-17

## 2022-09-24 RX ORDER — CEPHALEXIN 500 MG/1
CAPSULE ORAL
COMMUNITY
End: 2022-09-24

## 2022-09-24 RX ORDER — SULFAMETHOXAZOLE AND TRIMETHOPRIM 800; 160 MG/1; MG/1
TABLET ORAL
COMMUNITY
End: 2022-09-24

## 2022-09-24 RX ORDER — NITROFURANTOIN 25; 75 MG/1; MG/1
100 CAPSULE ORAL 2 TIMES DAILY
Qty: 10 CAPSULE | Refills: 0 | Status: SHIPPED | OUTPATIENT
Start: 2022-09-24 | End: 2022-09-29

## 2022-09-24 ASSESSMENT — ENCOUNTER SYMPTOMS
COUGH: 0
NAUSEA: 0
CONSTIPATION: 0
SINUS PAIN: 0
DIZZINESS: 0
VOMITING: 0
EYE DISCHARGE: 0
DIARRHEA: 0
SHORTNESS OF BREATH: 0
SORE THROAT: 0
NECK PAIN: 1
ABDOMINAL PAIN: 1
MYALGIAS: 1
EYE PAIN: 0
EYE REDNESS: 0
CHILLS: 0
HEADACHES: 0
FEVER: 0
WHEEZING: 0
DIAPHORESIS: 0

## 2022-09-24 NOTE — PROGRESS NOTES
Subjective:     Nita Jackman  is a 37 y.o. female who presents for UTI (Was told she had an UTI in the ER on Thursday, was not given anything to treat it. Symptoms worsening.) and Abdominal Pain (Recently had surgery 1 week ago, having abdominal px that is radiating up to her neck)       She presents today with urinary tract infections that have been ongoing for 3-4 days.  [facility] where they did diagnosed with a UTI but did not provide her any medications.  At this time she is experiencing urinary frequency and dysuria.  She did take over-the-counter Azo for her symptoms.  Denies flank pain, fever/chills/sweats.  She did undergo a TIF procedure on 9/14/2022 and has some residual abdominal pain from this procedure, has not gotten worsened since onset of her UTI symptoms.  Denies nausea/vomiting, diarrhea.  No blood in the stool or melena    She also presents today with cervical myofascial pain that has been ongoing since her TIPS procedure on 9/14/2022.  She has been taking gabapentin and tramadol for the symptoms, without relief.  Denies injury or trauma to the cervical spine.  No upper extremity radicular symptoms.     Review of Systems   Constitutional:  Negative for chills, diaphoresis, fever and malaise/fatigue.   HENT:  Negative for congestion, ear discharge, sinus pain and sore throat.    Eyes:  Negative for pain, discharge and redness.   Respiratory:  Negative for cough, shortness of breath and wheezing.    Cardiovascular:  Negative for chest pain.   Gastrointestinal:  Positive for abdominal pain. Negative for constipation, diarrhea, nausea and vomiting.   Genitourinary:  Positive for dysuria, frequency and urgency.   Musculoskeletal:  Positive for myalgias and neck pain.   Neurological:  Negative for dizziness and headaches.    Allergies   Allergen Reactions    Hydrocodone-Acetaminophen     Oxycodone-Acetaminophen     Percocet  "[Oxycodone-Acetaminophen] Itching     And nausea    Vicodin [Hydrocodone-Acetaminophen] Vomiting     Past Medical History:   Diagnosis Date    Asthma     Diabetes     Retinopathy due to secondary diabetes (HCC)     Thyroid disorder         Objective:   /62 (BP Location: Right arm, Patient Position: Sitting, BP Cuff Size: Adult)   Pulse 94   Temp 36.7 °C (98 °F) (Temporal)   Resp 18   Ht 1.727 m (5' 8\")   Wt 95.3 kg (210 lb)   SpO2 95%   BMI 31.93 kg/m²   Physical Exam  Vitals and nursing note reviewed.   Constitutional:       General: She is not in acute distress.     Appearance: Normal appearance. She is not ill-appearing, toxic-appearing or diaphoretic.   HENT:      Head: Normocephalic.      Right Ear: Tympanic membrane, ear canal and external ear normal. There is no impacted cerumen.      Left Ear: Tympanic membrane, ear canal and external ear normal. There is no impacted cerumen.      Nose: No congestion or rhinorrhea.      Mouth/Throat:      Mouth: Mucous membranes are moist.      Pharynx: No oropharyngeal exudate or posterior oropharyngeal erythema.   Eyes:      General:         Right eye: No discharge.         Left eye: No discharge.      Conjunctiva/sclera: Conjunctivae normal.   Cardiovascular:      Rate and Rhythm: Normal rate and regular rhythm.   Pulmonary:      Effort: Pulmonary effort is normal. No respiratory distress.      Breath sounds: Normal breath sounds. No stridor. No wheezing or rhonchi.   Abdominal:      General: Abdomen is flat. Bowel sounds are normal. There is no distension.      Palpations: Abdomen is soft.      Tenderness: There is abdominal tenderness. There is no guarding or rebound.   Musculoskeletal:      Cervical back: Neck supple.      Comments: Tenderness to palpation over the cervical spine musculature and bilateral upper trapezius musculature.  No bony tenderness or step-off abnormalities.  Upper extremity range of motion, strength, sensation intact and comparable " bilaterally.   Lymphadenopathy:      Cervical: No cervical adenopathy.   Neurological:      General: No focal deficit present.      Mental Status: She is alert and oriented to person, place, and time.   Psychiatric:         Mood and Affect: Mood normal.         Behavior: Behavior normal.         Thought Content: Thought content normal.         Judgment: Judgment normal.           Diagnostic testing:    POC urinalysis-positive glucose, moderate bilirubin, trace ketones, positive protein, positive urobilirubin, positive nitrites, all others within normal limits    Urine culture-pending    POC urine hCG-negative.    Assessment/Plan:     Encounter Diagnoses   Name Primary?    Acute cystitis with hematuria     Cervical myofascial pain syndrome         Plan for care for today's complaint includes continue previously prescribed tramadol, gabapentin.  Will prescribe tizanidine at this time for cervical myofascial pain.  She should contact her surgeon for her ongoing abdominal pain.  Contact primary care provider for follow-up appointment.  We will trial nitrofurantoin for her acute cystitis, will contact patient via [employer] message to discuss results of urine culture and will adjust treatment plan accordingly.  Continue to monitor symptoms and return to urgent care or follow-up with primary care provider if symptoms remain ongoing.  Follow-up in the emergency department if symptoms become severe, ER precautions discussed in office today..  Prescription for nitrofurantoin, tizanidine provided.    See AVS Instructions below for written guidance provided to patient on after-visit management and care in addition to our verbal discussion during the visit.    Please note that this dictation was created using voice recognition software. I have attempted to correct all errors, but there may be sound-alike, spelling, grammar and possibly content errors that I did not discover before finalizing the note.    Jules Yen PA-C

## 2022-09-26 LAB
BACTERIA UR CULT: NORMAL
SIGNIFICANT IND 70042: NORMAL
SITE SITE: NORMAL
SOURCE SOURCE: NORMAL

## 2022-10-20 ENCOUNTER — TELEPHONE (OUTPATIENT)
Dept: NEUROLOGY | Facility: MEDICAL CENTER | Age: 37
End: 2022-10-20
Payer: COMMERCIAL

## 2022-10-20 NOTE — TELEPHONE ENCOUNTER
Established Patient     [employer] Patient is checked in Patient Demographics? Yes    Is visit type and length correct?  Yes    Is referral attached to visit? Yes    Were records received from referring provider? No    Patient was not contacted to have someone accompany them to visit?    Is this appointment scheduled as a Hospital Follow-Up?  No    Does the patient require any pre procedure or post procedure follow up? No    If any orders were placed at last visit or intended to be done for this visit do we have Results/Consult Notes? No  Labs - Labs were not ordered at last office visit.  Note: If patient appointment is for lab review and patient did not complete labs, check with provider if OK to reschedule patient until labs completed.  Imaging - Imaging was not ordered at last office visit.  Referrals - No referrals were ordered at last office visit.        10.  If patient appointment is for Botox - is order pended for provider? No

## 2022-11-02 DIAGNOSIS — E10.42 DIABETIC POLYNEUROPATHY ASSOCIATED WITH TYPE 1 DIABETES MELLITUS (HCC): ICD-10-CM

## 2022-11-02 RX ORDER — CLONIDINE HYDROCHLORIDE 0.3 MG/1
0.3 TABLET ORAL 2 TIMES DAILY
Qty: 60 TABLET | Refills: 5 | Status: SHIPPED | OUTPATIENT
Start: 2022-11-02 | End: 2023-01-31 | Stop reason: SDUPTHER

## 2022-11-17 ENCOUNTER — OFFICE VISIT (OUTPATIENT)
Dept: URGENT CARE | Facility: PHYSICIAN GROUP | Age: 37
End: 2022-11-17
Payer: COMMERCIAL

## 2022-11-17 VITALS
WEIGHT: 207 LBS | DIASTOLIC BLOOD PRESSURE: 84 MMHG | BODY MASS INDEX: 31.37 KG/M2 | HEIGHT: 68 IN | HEART RATE: 90 BPM | RESPIRATION RATE: 16 BRPM | OXYGEN SATURATION: 97 % | TEMPERATURE: 97.7 F | SYSTOLIC BLOOD PRESSURE: 132 MMHG

## 2022-11-17 DIAGNOSIS — R05.9 COUGH, UNSPECIFIED TYPE: ICD-10-CM

## 2022-11-17 DIAGNOSIS — J32.9 RHINOSINUSITIS: ICD-10-CM

## 2022-11-17 LAB
EXTERNAL QUALITY CONTROL: NORMAL
FLUAV+FLUBV AG SPEC QL IA: NEGATIVE
INT CON NEG: NEGATIVE
INT CON NEG: NEGATIVE
INT CON POS: POSITIVE
INT CON POS: POSITIVE
SARS-COV+SARS-COV-2 AG RESP QL IA.RAPID: NEGATIVE

## 2022-11-17 PROCEDURE — 99213 OFFICE O/P EST LOW 20 MIN: CPT | Performed by: FAMILY MEDICINE

## 2022-11-17 PROCEDURE — 87426 SARSCOV CORONAVIRUS AG IA: CPT | Performed by: FAMILY MEDICINE

## 2022-11-17 PROCEDURE — 87804 INFLUENZA ASSAY W/OPTIC: CPT | Performed by: FAMILY MEDICINE

## 2022-11-17 RX ORDER — AMOXICILLIN AND CLAVULANATE POTASSIUM 875; 125 MG/1; MG/1
1 TABLET, FILM COATED ORAL 2 TIMES DAILY
Qty: 14 TABLET | Refills: 0 | Status: SHIPPED | OUTPATIENT
Start: 2022-11-17 | End: 2022-11-24

## 2022-11-17 ASSESSMENT — ENCOUNTER SYMPTOMS
WEIGHT LOSS: 0
VOMITING: 0
MYALGIAS: 0
NAUSEA: 0
EYE REDNESS: 0
EYE DISCHARGE: 0

## 2022-11-17 NOTE — LETTER
November 17, 2022         Patient: Nita Jackman   YOB: 1985   Date of Visit: 11/17/2022           To Whom it May Concern:    Nita Jackman was seen in my clinic on 11/17/2022. COVID-19 and influenza testing are negative.     Sincerely,           James Dasilva M.D.  Electronically Signed

## 2022-11-18 NOTE — PROGRESS NOTES
"Subjective     Nita Jackman is a 37 y.o. female who presents with Nasal Congestion (With dry cough started monday morning)            1 week sinus pressure and drainage. Symptoms worsening today. PMH sinusitis/no sinus surgery. Dry cough. No fever. +SOB/wheeze. +PMH asthma, using albuterol occas. No PMH C19. No other aggravating or alleviating factors.        Review of Systems   Constitutional:  Negative for malaise/fatigue and weight loss.   Eyes:  Negative for discharge and redness.   Gastrointestinal:  Negative for nausea and vomiting.   Musculoskeletal:  Negative for joint pain and myalgias.   Skin:  Negative for itching and rash.            Objective     /84 (BP Location: Right arm, Patient Position: Sitting, BP Cuff Size: Adult)   Pulse 90   Temp 36.5 °C (97.7 °F) (Temporal)   Resp 16   Ht 1.727 m (5' 8\")   Wt 93.9 kg (207 lb)   SpO2 97%   BMI 31.47 kg/m²      Physical Exam  Constitutional:       General: She is not in acute distress.     Appearance: She is well-developed.   HENT:      Head: Normocephalic and atraumatic.      Right Ear: Tympanic membrane normal.      Left Ear: Tympanic membrane normal.      Nose: Congestion present.      Mouth/Throat:      Comments: PND  Eyes:      Conjunctiva/sclera: Conjunctivae normal.   Cardiovascular:      Rate and Rhythm: Normal rate and regular rhythm.      Heart sounds: Normal heart sounds. No murmur heard.  Pulmonary:      Effort: Pulmonary effort is normal.      Breath sounds: Normal breath sounds. No wheezing.   Skin:     General: Skin is warm and dry.      Findings: No rash.   Neurological:      Mental Status: She is alert.                           Assessment & Plan   POCT COVID-negative  POCT influenza negative    1. Rhinosinusitis  POCT SARS-COV Antigen ANGIE (Symptomatic only)    POCT Influenza A/B    amoxicillin-clavulanate (AUGMENTIN) 875-125 MG Tab      2. Cough, unspecified type  POCT SARS-COV Antigen ANGIE (Symptomatic only)    POCT Influenza " A/B        Differential diagnosis, natural history, supportive care, and indications for immediate follow-up discussed at length.     Nasal saline, decongestant, nasal corticosteroid    Contingent antibiotic prescription given to patient to fill upon meeting criteria of guidelines discussed.

## 2022-11-21 DIAGNOSIS — E10.42 DIABETIC POLYNEUROPATHY ASSOCIATED WITH TYPE 1 DIABETES MELLITUS (HCC): ICD-10-CM

## 2022-11-21 RX ORDER — GABAPENTIN 600 MG/1
600 TABLET ORAL EVERY EVENING
Qty: 90 TABLET | Refills: 3 | Status: SHIPPED | OUTPATIENT
Start: 2022-11-21 | End: 2023-01-31 | Stop reason: SDUPTHER

## 2022-11-22 NOTE — TELEPHONE ENCOUNTER
Received request via: Pharmacy    Was the patient seen in the last year in this department? No    Does the patient have an active prescription (recently filled or refills available) for medication(s) requested? No    Does the patient have [facility] and need 100 day supply (blood pressure, diabetes and cholesterol meds only)? Patient does not have SCP

## 2023-01-31 ENCOUNTER — OFFICE VISIT (OUTPATIENT)
Dept: NEUROLOGY | Facility: MEDICAL CENTER | Age: 38
End: 2023-01-31
Attending: PSYCHIATRY & NEUROLOGY
Payer: COMMERCIAL

## 2023-01-31 VITALS
TEMPERATURE: 98.6 F | SYSTOLIC BLOOD PRESSURE: 134 MMHG | WEIGHT: 213.41 LBS | HEART RATE: 107 BPM | DIASTOLIC BLOOD PRESSURE: 80 MMHG | HEIGHT: 68 IN | BODY MASS INDEX: 32.34 KG/M2 | OXYGEN SATURATION: 96 %

## 2023-01-31 DIAGNOSIS — G25.81 RLS (RESTLESS LEGS SYNDROME): ICD-10-CM

## 2023-01-31 DIAGNOSIS — E10.42 DIABETIC POLYNEUROPATHY ASSOCIATED WITH TYPE 1 DIABETES MELLITUS (HCC): ICD-10-CM

## 2023-01-31 PROCEDURE — 99214 OFFICE O/P EST MOD 30 MIN: CPT | Performed by: PSYCHIATRY & NEUROLOGY

## 2023-01-31 PROCEDURE — 99212 OFFICE O/P EST SF 10 MIN: CPT | Performed by: PSYCHIATRY & NEUROLOGY

## 2023-01-31 RX ORDER — PROCHLORPERAZINE 25 MG/1
SUPPOSITORY RECTAL
COMMUNITY
Start: 2023-01-30

## 2023-01-31 RX ORDER — INSULIN DEGLUDEC 200 U/ML
INJECTION, SOLUTION SUBCUTANEOUS
COMMUNITY
Start: 2023-01-30

## 2023-01-31 RX ORDER — INSULIN LISPRO 100 [IU]/ML
INJECTION, SOLUTION INTRAVENOUS; SUBCUTANEOUS
COMMUNITY

## 2023-01-31 RX ORDER — NORETHINDRONE ACETATE AND ETHINYL ESTRADIOL AND FERROUS FUMARATE 1.5-30(21)
KIT ORAL
COMMUNITY
Start: 2022-11-16

## 2023-01-31 RX ORDER — CLONIDINE HYDROCHLORIDE 0.3 MG/1
TABLET ORAL
Qty: 90 TABLET | Refills: 3 | Status: SHIPPED | OUTPATIENT
Start: 2023-01-31 | End: 2024-03-01

## 2023-01-31 RX ORDER — INSULIN PUMP CONTROLLER
EACH MISCELLANEOUS
COMMUNITY
Start: 2023-01-17

## 2023-01-31 RX ORDER — GABAPENTIN 600 MG/1
600 TABLET ORAL EVERY EVENING
Qty: 90 TABLET | Refills: 3 | Status: SHIPPED | OUTPATIENT
Start: 2023-01-31 | End: 2023-11-22

## 2023-01-31 ASSESSMENT — ENCOUNTER SYMPTOMS
INSOMNIA: 0
MEMORY LOSS: 0
TINGLING: 1
WEIGHT LOSS: 0
SENSORY CHANGE: 1

## 2023-01-31 ASSESSMENT — PATIENT HEALTH QUESTIONNAIRE - PHQ9: CLINICAL INTERPRETATION OF PHQ2 SCORE: 0

## 2023-01-31 NOTE — PROGRESS NOTES
Subjective     Nita Jackman is a 37 y.o. female who presents for follow-up, with a history of a painful diabetic polyneuropathy and symptomatic RLS.     HPI    Neuropathy: Now on Catapres 0.3 mg tablets taken about 4 PM every day, she has had a consistent benefit in her pain.  Though the medication does begin to wear-off about an hour or so before each dose in the afternoon, she gets through the day with minimal discomfort.  She can stay on her feet for longer, the aching in the feet minimally problematic.  Now at nighttime she can get into bed without the hyperpathia on the surface of the feet.  There is no weakness, balance is stable.  She continues to deny any involvement of the hands or fingers.  There is no weakness with foot drop.  Symptoms remain mostly on the soles of the feet, occasionally on the dorsum if symptoms increase.    Though originally on a Catapres TTS system of application, the adhesive began to develop problems with her skin.  She is now on 0.3 mg tablet taken once a day with good benefit.  Taken twice a day and she was simply too sleepy and weak to function.    She was able to lose about 10 pounds once we got her off Lyrica, another 10 pounds after she had undergone a fundal plication procedure.  Unfortunately she put back on about 10 pounds.    RLS: Still stable on gabapentin 600 mg taken 2 hours before bed, she no longer has rebound occur in the early morning hours with better symptomatic relief of her neuropathy.  She tolerates the drug without issue.    Medical, surgical and family histories are reviewed, there are no new drug allergies.  She is on Catapres 0.3 mg taken at 4 PM, Neurontin 6 mg 2 hours before bed, Synthroid, Zyrtec, Lexapro, NovoLog insulin, Tresiba, birth-control and insulin lispro.    Review of Systems   Constitutional:  Negative for malaise/fatigue and weight loss.   Neurological:  Positive for tingling and sensory change.   Psychiatric/Behavioral:  Negative for memory  "loss. The patient does not have insomnia.    All other systems reviewed and are negative.    Objective     /80 (BP Location: Right arm, Patient Position: Sitting, BP Cuff Size: Adult)   Pulse (!) 107   Temp 37 °C (98.6 °F) (Temporal)   Ht 1.727 m (5' 8\")   Wt 96.8 kg (213 lb 6.5 oz)   SpO2 96%   BMI 32.45 kg/m²      Physical Exam    She appears in no acute distress.  Vital signs are stable.  There is no malar rash.  The neck is supple.  Cardiac evaluation is unremarkable.     Neurological Exam    Fully oriented, there is no aphasia, apraxia, or inattention.    PERRLA/EOMI, visual fields are full, facial movements are symmetric, sensory exam is intact to temperature and light touch.  The tongue and uvula are midline, there is no bulbar dysfunction.  Shoulder shrug and head rotation are normal.    Musculoskeletal exam reveals normal tone throughout, there is no tremor or drift.  Strength is 5/5 in all 4 extremities.  Reflexes are still absent at the ankles, trace present at the knees.  There are no asymmetries.  The toes are downgoing.    She walks easily, she does not need look at the ground to maintain balance.  Station is normal, stride length maintained and armswing is symmetric.  Heel, toe, and tandem walking cannot be done without assistance.  Repetitive movements are a little slowed in the feet, but they are symmetric.  They are normal in the upper extremities.  There is no appendicular dystaxia.    Sensory exam reveals a minimal stocking pattern decrement of vibration below the ankles, JPS and temperature are intact.  Romberg is absent.    Assessment & Plan     1. Diabetic polyneuropathy associated with type 1 diabetes mellitus (HCC)  Symptomatically, she is doing well, her examination today shows only minimal change.  Both of us are quite pleased with this, obviously.  She will continue the Catapres is a 0.3 mg tablet taken every afternoon.  Moving forward we may need to split the dose to give a " better 24-hour coverage.  We will cross that bridge when we get there.    - cloNIDine (CATAPRES) 0.3 MG Tab; 1 tab daily at 4-5 PM  Dispense: 90 Tablet; Refill: 3    2. RLS (restless legs syndrome)  Also stable, with the additional pain benefit from the Catapres, it is not surprising she is getting some additional benefit with less rebound phenomena in the early morning hours.  We will follow-up in 1 year.    - gabapentin (NEURONTIN) 600 MG tablet; Take 1 Tablet by mouth every evening.  Dispense: 90 Tablet; Refill: 3    Time: 30 minutes in total spent on patient care including precharting, record review, discussion with healthcare staff and documentation.  This includes face-to-face time for exam, review, discussion, as well as counseling and coordinating care.

## 2023-11-22 DIAGNOSIS — G25.81 RLS (RESTLESS LEGS SYNDROME): ICD-10-CM

## 2023-11-22 RX ORDER — GABAPENTIN 600 MG/1
600 TABLET ORAL EVERY EVENING
Qty: 90 TABLET | Refills: 3 | Status: SHIPPED | OUTPATIENT
Start: 2023-11-22

## 2023-11-22 NOTE — TELEPHONE ENCOUNTER
Received request via: Pharmacy    Was the patient seen in the last year in this department? Yes   Date of last office visit 1/31/23     Per last Neurology Office Visit, when was the date of next follow up visit set for?  1 yr                          Date of office visit follow up request 1/31/24     Does the patient have an upcoming appointment? No   If yes, when?     Does the patient have an active prescription (recently filled or refills available) for medication(s) requested? No    Does the patient have [facility] and need 100 day supply (blood pressure, diabetes and cholesterol meds only)? Medication is not for cholesterol, blood pressure or diabetes

## 2024-02-28 DIAGNOSIS — E10.42 DIABETIC POLYNEUROPATHY ASSOCIATED WITH TYPE 1 DIABETES MELLITUS (HCC): ICD-10-CM

## 2024-02-29 NOTE — TELEPHONE ENCOUNTER
Received request via: Pharmacy    Medication Name/Dosage  CLONIDINE 0.3MG TABLETS     When was medication last prescribed 1/31/23    How many refills were previously provided 3    How many Refills does he patient have left from last prescription 0    Was the patient seen in the last year in this department? No   Date of last office visit 1/31/23     Per last Neurology Office Visit, when was the date of next follow up visit set for?                            Date of office visit follow up request 1/31/24     Does the patient have an upcoming appointment? No   If yes, when              If no, schedule appointment     Does the patient have [facility] Plus and need 100 day supply (blood pressure, diabetes and cholesterol meds only)? Medication is not for cholesterol, blood pressure or diabetes

## 2024-03-01 RX ORDER — CLONIDINE HYDROCHLORIDE 0.3 MG/1
TABLET ORAL
Qty: 90 TABLET | Refills: 1 | Status: SHIPPED | OUTPATIENT
Start: 2024-03-01

## 2024-03-01 NOTE — TELEPHONE ENCOUNTER
Patient needs a follow-up appointment.  Please make sure there is 1 on the books.  She has 6 more months worth of refills, nothing after that until she has been seen in the office.

## 2024-04-09 ENCOUNTER — OFFICE VISIT (OUTPATIENT)
Dept: URGENT CARE | Facility: PHYSICIAN GROUP | Age: 39
End: 2024-04-09
Payer: COMMERCIAL

## 2024-04-09 VITALS
RESPIRATION RATE: 20 BRPM | WEIGHT: 221.6 LBS | HEART RATE: 112 BPM | HEIGHT: 68 IN | DIASTOLIC BLOOD PRESSURE: 68 MMHG | TEMPERATURE: 97.4 F | BODY MASS INDEX: 33.59 KG/M2 | OXYGEN SATURATION: 94 % | SYSTOLIC BLOOD PRESSURE: 104 MMHG

## 2024-04-09 DIAGNOSIS — U07.1 COVID-19: ICD-10-CM

## 2024-04-09 DIAGNOSIS — J02.0 STREP PHARYNGITIS: ICD-10-CM

## 2024-04-09 DIAGNOSIS — R68.89 FLU-LIKE SYMPTOMS: ICD-10-CM

## 2024-04-09 DIAGNOSIS — J02.9 SORE THROAT: ICD-10-CM

## 2024-04-09 LAB
FLUAV RNA SPEC QL NAA+PROBE: NEGATIVE
FLUBV RNA SPEC QL NAA+PROBE: NEGATIVE
RSV RNA SPEC QL NAA+PROBE: NEGATIVE
S PYO DNA SPEC NAA+PROBE: DETECTED
SARS-COV-2 RNA RESP QL NAA+PROBE: POSITIVE

## 2024-04-09 PROCEDURE — 3074F SYST BP LT 130 MM HG: CPT | Performed by: PHYSICIAN ASSISTANT

## 2024-04-09 PROCEDURE — 3078F DIAST BP <80 MM HG: CPT | Performed by: PHYSICIAN ASSISTANT

## 2024-04-09 PROCEDURE — 87651 STREP A DNA AMP PROBE: CPT | Performed by: PHYSICIAN ASSISTANT

## 2024-04-09 PROCEDURE — 99214 OFFICE O/P EST MOD 30 MIN: CPT | Performed by: PHYSICIAN ASSISTANT

## 2024-04-09 PROCEDURE — 0241U POCT CEPHEID COV-2, FLU A/B, RSV - PCR: CPT | Performed by: PHYSICIAN ASSISTANT

## 2024-04-09 RX ORDER — AMOXICILLIN 500 MG/1
500 CAPSULE ORAL 2 TIMES DAILY
Qty: 20 CAPSULE | Refills: 0 | Status: SHIPPED | OUTPATIENT
Start: 2024-04-09 | End: 2024-04-19

## 2024-04-09 RX ORDER — DEXAMETHASONE SODIUM PHOSPHATE 10 MG/ML
10 INJECTION INTRAMUSCULAR; INTRAVENOUS ONCE
Status: COMPLETED | OUTPATIENT
Start: 2024-04-09 | End: 2024-04-09

## 2024-04-09 RX ADMIN — DEXAMETHASONE SODIUM PHOSPHATE 10 MG: 10 INJECTION INTRAMUSCULAR; INTRAVENOUS at 09:39

## 2024-04-09 NOTE — PROGRESS NOTES
Subjective:   Nita Jackman is a 38 y.o. female who presents for Pharyngitis (Cough, sinus pressure, body aches and sore throat x yesterday morning.)     ST, sinus drainage, cough, leaving the country Sunday.  ST with pain swallowing  Chills and myalgias yesterday  Symptoms started yesterday  Tried and cough drops, mucinex, nyquil  No GI symptoms  H/o asthma, using rescue inhaler  Mild SOB, chest tightness  No known sick contacts.      Medications:  albuterol Aers  cloNIDine Tabs  Dexcom G6 Sensor Misc  escitalopram Tabs  gabapentin  insulin aspart Soln  insulin lispro  Junel FE 1.5/30 Tabs  levothyroxine Tabs  meloxicam  Omnipod DASH Pods (Gen 4) Misc  Tresiba FlexTouch Sopn  ZyrTEC Allergy Caps    Allergies:             Hydrocodone-acetaminophen, Oxycodone-acetaminophen, Percocet [oxycodone-acetaminophen], and Vicodin [hydrocodone-acetaminophen]    Surgical History:         Past Surgical History:   Procedure Laterality Date    MN LAP,APPENDECTOMY  7/29/2020    Procedure: APPENDECTOMY, LAPAROSCOPIC;  Surgeon: Mac Rouse M.D.;  Location: SURGERY [facility];  Service: General    CARPAL TUNNEL ENDOSCOPIC  5/24/2011    Performed by WAYLON COLEMAN at SURGERY [facility]    VITRECTOMY POSTERIOR  5/4/2009    Performed by DARYL DASILVA at SURGERY SAME DAY [facility]    EYE CRYOSURGERY  5/4/2009    Performed by DARYL DASILVA at SURGERY SAME DAY [facility]    RETINAL DETACHMENT REPAIR  7/11/2008    left/right eye       Past Social Hx:  Nita Jackman  reports that she has never smoked. She has never used smokeless tobacco. She reports current alcohol use of about 1.2 - 1.8 oz of alcohol per week. She reports current drug use. Frequency: 1.00 time per week. Drug: Marijuana.     Past Family Hx:   Nita Jackman family history includes Diabetes in an other family member; Hyperlipidemia in her father; Hypertension in an other family member; Stroke in an other family member.       Problem list,  "medications, and allergies reviewed by myself today in Epic.     Objective:     /68 (BP Location: Left arm, Patient Position: Sitting, BP Cuff Size: Adult long)   Pulse (!) 112   Temp 36.3 °C (97.4 °F) (Temporal)   Resp 20   Ht 1.727 m (5' 8\")   Wt 101 kg (221 lb 9.6 oz)   SpO2 94%   BMI 33.69 kg/m²     Physical Exam  Vitals and nursing note reviewed.   Constitutional:       General: She is not in acute distress.     Appearance: Normal appearance. She is not ill-appearing or toxic-appearing.   HENT:      Head: Normocephalic.      Jaw: No trismus.      Right Ear: External ear normal. No drainage. A middle ear effusion is present. No mastoid tenderness. Tympanic membrane is not erythematous or bulging.      Left Ear: External ear normal. No drainage. A middle ear effusion is present. No mastoid tenderness. Tympanic membrane is not erythematous or bulging.      Nose: Congestion and rhinorrhea present.      Right Turbinates: Enlarged and swollen.      Left Turbinates: Enlarged and swollen.      Mouth/Throat:      Mouth: Mucous membranes are moist.      Tongue: No lesions. Tongue does not deviate from midline.      Palate: No lesions.      Pharynx: Uvula midline. Posterior oropharyngeal erythema present. No oropharyngeal exudate or uvula swelling.      Tonsils: No tonsillar exudate or tonsillar abscesses.   Eyes:      General:         Right eye: No discharge.         Left eye: No discharge.      Extraocular Movements: Extraocular movements intact.   Cardiovascular:      Rate and Rhythm: Normal rate and regular rhythm.      Pulses: Normal pulses.      Heart sounds: Normal heart sounds. No murmur heard.  Pulmonary:      Effort: Pulmonary effort is normal. No accessory muscle usage or respiratory distress.      Breath sounds: Normal breath sounds and air entry. No stridor or decreased air movement. No wheezing, rhonchi or rales.      Comments: Lungs CTA b/l  Musculoskeletal:      Cervical back: Normal range of " motion. No rigidity.   Lymphadenopathy:      Head:      Right side of head: No tonsillar adenopathy.      Left side of head: No tonsillar adenopathy.      Cervical: No cervical adenopathy.   Skin:     General: Skin is warm.      Findings: No rash.   Neurological:      Mental Status: She is alert.   Psychiatric:         Behavior: Behavior is cooperative.       Results for orders placed or performed in visit on 04/09/24   POCT CEPHEID GROUP A STREP - PCR   Result Value Ref Range    POC Group A Strep, PCR Detected (A) Not Detected, Invalid   POCT CEPHEID COV-2, FLU A/B, RSV - PCR   Result Value Ref Range    SARS-CoV-2 by PCR Positive (A) Negative, Invalid    Influenza virus A RNA Negative Negative, Invalid    Influenza virus B, PCR Negative Negative, Invalid    RSV, PCR Negative Negative, Invalid         Assessment/Plan:     Diagnosis and Associated Orders:     1. Flu-like symptoms  - POCT CEPHEID GROUP A STREP - PCR  - POCT CEPHEID COV-2, FLU A/B, RSV - PCR    2. Sore throat  - POCT CEPHEID GROUP A STREP - PCR  - dexamethasone (Decadron) injection (check route below) 10 mg    3. Strep pharyngitis  - amoxicillin (AMOXIL) 500 MG Cap; Take 1 Capsule by mouth 2 times a day for 10 days.  Dispense: 20 Capsule; Refill: 0    4. COVID-19  - Nirmatrelvir&Ritonavir 300/100 20 x 150 MG & 10 x 100MG Tablet Therapy Pack; Take 300 mg nirmatrelvir (two 150 mg tablets) with 100 mg ritonavir (one 100 mg tablet) by mouth, with all three tablets taken together twice daily for 5 days.  Dispense: 30 Each; Refill: 0        Comments/MDM:  This a pleasant 38-year-old female who presents with flulike symptoms, tested positive for strep pharyngitis and COVID-19.  Amoxicillin prescribed twice a day x 10 days, complete entire course of antibiotics.  Salt water gargles, Tylenol/ibuprofen as needed for pain.  Throw away toothbrush in 2 to 3 days time to avoid reinfection.  Patient is a candidate for Paxlovid with history of type 1 diabetes and  asthma.  [facility] [place] interaction  was utilized to look for drug interactions which were not found.  Discussed management of COVID symptoms and return precautions at length.  Today her vital signs are stable and reassuring.  She is not hypoxic.  Lungs are clear.  Low suspicion for bacterial pneumonia.  31 minutes was allotted and spent for patient care and coordination of care (not reported separately) including preparing for the visit, obtaining/reviewing history from/with patient, performing an exam/evaluation, ordering Rx, utilizing [place] [employer] interaction .  Developing a plan of care, counseling/educating the patient, developing the discharge summary for release to [facility], and documentation. This template was updated to summarize care specific to this encounter.    I personally reviewed prior external notes and test results pertinent to today's visit. Supportive care, natural history, differential diagnoses, and indications for immediate follow-up discussed. Return to clinic or go to ED if symptoms worsen or persist.  Red flag symptoms discussed.  Patient/Parent/Guardian voices understanding. Follow-up with your primary care provider in 3-5 days.  All side effects of medication discussed including allergic response, GI upset, tendon injury, rash, sedation etc    Please note that this dictation was created using voice recognition software. I have made a reasonable attempt to correct obvious errors, but I expect that there are errors of grammar and possibly content that I did not discover before finalizing the note.    This note was electronically signed by Ledy Ramirez PA-C

## 2024-06-04 ENCOUNTER — OFFICE VISIT (OUTPATIENT)
Dept: NEUROLOGY | Facility: MEDICAL CENTER | Age: 39
End: 2024-06-04
Attending: PSYCHIATRY & NEUROLOGY
Payer: COMMERCIAL

## 2024-06-04 DIAGNOSIS — E10.42 DIABETIC POLYNEUROPATHY ASSOCIATED WITH TYPE 1 DIABETES MELLITUS (HCC): Primary | ICD-10-CM

## 2024-06-04 DIAGNOSIS — G25.81 RLS (RESTLESS LEGS SYNDROME): ICD-10-CM

## 2024-06-04 PROCEDURE — 99214 OFFICE O/P EST MOD 30 MIN: CPT | Performed by: PSYCHIATRY & NEUROLOGY

## 2024-06-04 PROCEDURE — 99212 OFFICE O/P EST SF 10 MIN: CPT | Performed by: PSYCHIATRY & NEUROLOGY

## 2024-06-04 ASSESSMENT — PATIENT HEALTH QUESTIONNAIRE - PHQ9: CLINICAL INTERPRETATION OF PHQ2 SCORE: 0

## 2024-06-04 ASSESSMENT — ENCOUNTER SYMPTOMS
TINGLING: 1
DIZZINESS: 0
FOCAL WEAKNESS: 0
WEAKNESS: 0
LOSS OF CONSCIOUSNESS: 0
FALLS: 0
WEIGHT LOSS: 0

## 2024-06-04 NOTE — PROGRESS NOTES
Subjective     Nita Jackman is a 39 y.o. female who presents for follow-up, last seen in January, 2023, with a history of a diabetic polyneuropathy and symptomatic RLS.     HPI    Neuropathy: Nita states that the unpleasant nerve symptoms still remain in the feet only, mostly the soles but certainly below the ankles.  She has noted an aching sensation that extends proximally up to the knees and now in the upper extremities from the hands to above the elbows bilaterally.  It is not positional or activity related, she is status post bilateral CTS release surgery years ago, and this is a little different.  The symptoms do not awaken her from sleep in the evenings or nighttime, they are not worse in the mornings.  In the hands she does note at night if she is holding a book in front of her that some of the aching sensation can increase.    In the legs there is no issue with radiation of symptoms, there is no weakness, though she has been scuffing the outside of the toe box on her left shoe a little bit more often.  Still she is not falling.  She does not look at the ground.  During her trip to [place] earlier this year, she had no problem handling cobblestones and uneven surfaces walking through old cities.    She is not taking her Catapres 0.3 mg tablet at about 6 PM to get her through the evening.  She tolerates the drug, though taking it at 4 PM as before was leaving her a bit sleepy.  There are no issues with headache, syncope, loss of energy, or headache.    Her weight is still a problem, she is not put on anything, but she has been tried on Ozempic, it worked to lower her hemoglobin A1c, but because of her type I diabetic state, it was not FDA approved for other uses including weight loss.  They have tried to get others but there are simply national shortages of the drugs.    RLS: Still on gabapentin 600 mg, 2 hours before bed, she falls asleep easily and there is no rebound phenomena in the early part of the  night.  She denies any augmentation issues, the symptoms still present in the evenings and that she gets into bed.  It is still in the legs only, she denies any more generalized pattern.  On gabapentin she has had no problems with edema, dizziness, worsening gait, irritability or cognitive impairment.    Medical, surgical and family histories are reviewed, there are no new drug allergies.  She is still on clonidine 0.3 mg tablets at 6 PM, Neurontin 600 mg 2 hours before bed, Synthroid, Mobic 15 mg daily, insulin via pump, Junel FE, Tresiba injections weekly.    Review of Systems   Constitutional:  Negative for malaise/fatigue and weight loss.   Cardiovascular:  Negative for leg swelling.   Musculoskeletal:  Negative for falls.   Neurological:  Positive for tingling. Negative for dizziness, focal weakness, loss of consciousness and weakness.   All other systems reviewed and are negative.    Objective     Physical Exam    She appears in no acute distress.  She is quite cooperative.  There is no malar rash or jaw claudication.  The neck is supple.  Cardiac evaluation is unremarkable.  Straight leg raising is negative bilaterally.  There is no tenderness at the elbows or wrist bilaterally, Tinel and Phalen signs are absent bilaterally.  Distal pulses are intact.  Pes cavus is unchanged, there is no back tenderness.     Neurological Exam    Fully oriented, there is no aphasia, apraxia, or inattention.    PERRLA/EOMI, visual fields are full on finger counting bilaterally, facial movements are symmetric, sensory exam is intact to temperature and light touch.  The tongue and uvula are midline, there is no bulbar dysfunction.  Shoulder shrug and head rotation are normal.  Jaw movements are intact.    Musculoskeletal exam reveals normal tone bilaterally, there is no tremor, asterixis, or drift.  Strength is intact in all 4 extremities, attention paid to distal musculatures throughout.    Reflexes are absent at the ankles,  diminished but present at the knees, slightly brisker but still symmetric in the upper extremities at all points.  The toes are equivocal to downgoing bilaterally.    She stands easily, gait is normal and station and stride length, she is not looking at the ground On any kind of regular basis to maintain balance.  Repetitive movements are little slowed in the left foot when compared to the right, this is new.  The movements are symmetric in the upper extremities.  There is no appendicular dystaxia throughout.    Sensory exam still reveals a stocking pattern decrement of pin below the ankle on the right, mid shin on the left, temperature is felt throughout as it is JPS and vibration.  All modalities are intact in the upper extremities.  Romberg is absent.    Assessment & Plan     1. Diabetic polyneuropathy associated with type 1 diabetes mellitus (HCC)  Clinically things are about the same though there is a little bit of weakness with the left foot, she witnesses this with scuffing of the toe in her left shoe, apparent on clinical exam with slowed fine motor control only.  Sensory exam is only slightly distorted and really unchanged.  She is doing incredibly well, she was congratulated on this.  She remains active.  Hopefully they can find medication to help her lose weight which and which will certainly help with her hemoglobin A1c values.  She will continue the Catapres 0.3 mg taken at 6 PM.    2. RLS (restless legs syndrome)  Gabapentin will be continued.  We have a long way to go in terms of dosing if needed.  I do not anticipate major changes anytime soon.    We will follow-up in 1 year.  We will communicate via [employer] in the interim if needed.    Time: 30 minutes in total spent on patient care including creatinine charting, record review, discussion with healthcare staff and documentation.  This includes face-to-face time for exam, review, discussion, as well as counseling and coordinating care.

## 2024-08-29 DIAGNOSIS — E10.42 DIABETIC POLYNEUROPATHY ASSOCIATED WITH TYPE 1 DIABETES MELLITUS (HCC): ICD-10-CM

## 2024-08-29 RX ORDER — CLONIDINE HYDROCHLORIDE 0.3 MG/1
TABLET ORAL
Qty: 90 TABLET | Refills: 1 | Status: SHIPPED | OUTPATIENT
Start: 2024-08-29

## 2024-08-29 NOTE — TELEPHONE ENCOUNTER
Received request via: Pharmacy    Medication Name/Dosage CLONIDINE     When was medication last prescribed 3/1/24    How many refills were previously provided 1    How many Refills does he patient have left from last prescription 0    Was the patient seen in the last year in this department? Yes   Date of last office visit 6/4/24     Per last Neurology Office Visit, when was the date of next follow up visit set for?                          1 YR   Date of office visit follow up request 6/4/25     Does the patient have an upcoming appointment? Yes   If yes, when 4/25/25             If no, schedule appointment     Does the patient have [facility] and need 100 day supply (blood pressure, diabetes and cholesterol meds only)? mEDICATION IS NOT FOR BLOOD PRESSURE, DIABETES, OR CHOLESTEROL

## 2024-10-27 NOTE — CARE PLAN
Problem: Communication  Goal: The ability to communicate needs accurately and effectively will improve  Outcome: PROGRESSING AS EXPECTED  Note: Pt communicates needs appropriately for assistance      Problem: Safety  Goal: Will remain free from injury  Outcome: PROGRESSING AS EXPECTED  Note: Pt calls appropriately for assistance, bed in locked, lowest position, call light and personal items within reach      Problem: Pain Management  Goal: Pain level will decrease to patient's comfort goal  Outcome: PROGRESSING AS EXPECTED  Flowsheets (Taken 7/30/2020 1152)  Non Verbal Scale:   Calm   Unlabored Breathing  Pain Rating Scale (NPRS): 2  Note: Pt's pain controlled by scheduled pain medication at this time      
  Problem: Safety  Goal: Will remain free from falls  Outcome: PROGRESSING AS EXPECTED  Note: Bed locked in low position. Call light within reach. Treaded socks on. Safety education provided.     Problem: Pain Management  Goal: Pain level will decrease to patient's comfort goal  Outcome: PROGRESSING AS EXPECTED  Note: Controlled on current regimen     
ongoing assessment
ongoing assessment

## 2025-02-09 ENCOUNTER — OFFICE VISIT (OUTPATIENT)
Dept: URGENT CARE | Facility: PHYSICIAN GROUP | Age: 40
End: 2025-02-09
Payer: COMMERCIAL

## 2025-02-09 VITALS
HEART RATE: 79 BPM | DIASTOLIC BLOOD PRESSURE: 68 MMHG | WEIGHT: 220.46 LBS | RESPIRATION RATE: 20 BRPM | HEIGHT: 68 IN | BODY MASS INDEX: 33.41 KG/M2 | OXYGEN SATURATION: 94 % | SYSTOLIC BLOOD PRESSURE: 104 MMHG | TEMPERATURE: 96.4 F

## 2025-02-09 DIAGNOSIS — J32.9 BACTERIAL SINUSITIS: ICD-10-CM

## 2025-02-09 DIAGNOSIS — B96.89 BACTERIAL SINUSITIS: ICD-10-CM

## 2025-02-09 PROCEDURE — 3078F DIAST BP <80 MM HG: CPT | Performed by: FAMILY MEDICINE

## 2025-02-09 PROCEDURE — 3074F SYST BP LT 130 MM HG: CPT | Performed by: FAMILY MEDICINE

## 2025-02-09 PROCEDURE — 99213 OFFICE O/P EST LOW 20 MIN: CPT | Performed by: FAMILY MEDICINE

## 2025-02-09 NOTE — PROGRESS NOTES
"  Subjective:      39 y.o. female presents to urgent care for cold symptoms that started about one week ago. She is experiencing headache, increased sinus pressure, ear pressure, nasal congestion, and cough. No tobacco product use.  She has asthma for which she uses albuterol. She has not needed this any more since getting sick.  She is vaccinated against COVID.  No known sick contacts.    She denies any other questions or concerns at this time.    Current problem list, medication, and past medical/surgical history were reviewed in Epic.    ROS  See HPI     Objective:      /68 (BP Location: Right arm, Patient Position: Sitting, BP Cuff Size: Large adult)   Pulse 79   Temp (!) 35.8 °C (96.4 °F) (Temporal)   Resp 20   Ht 1.727 m (5' 8\")   Wt 100 kg (220 lb 7.4 oz)   SpO2 94%   BMI 33.52 kg/m²     Physical Exam  Constitutional:       General: She is not in acute distress.     Appearance: She is not diaphoretic.   HENT:      Right Ear: Tympanic membrane, ear canal and external ear normal.      Left Ear: Tympanic membrane, ear canal and external ear normal.      Nose:      Right Sinus: Maxillary sinus tenderness and frontal sinus tenderness present.      Left Sinus: No maxillary sinus tenderness or frontal sinus tenderness.      Mouth/Throat:      Tongue: Tongue does not deviate from midline.      Palate: No lesions.      Pharynx: Uvula midline. No oropharyngeal exudate or posterior oropharyngeal erythema.      Tonsils: No tonsillar exudate. 1+ on the right. 1+ on the left.   Cardiovascular:      Rate and Rhythm: Normal rate and regular rhythm.      Heart sounds: Normal heart sounds.   Pulmonary:      Effort: Pulmonary effort is normal. No respiratory distress.      Breath sounds: Normal breath sounds.   Neurological:      Mental Status: She is alert.   Psychiatric:         Mood and Affect: Affect normal.         Judgment: Judgment normal.       Assessment/Plan:     1. Bacterial sinusitis  Criteria for " bacterial sinusitis has been met.  Prescription for Augmentin has been sent.  Tylenol, ibuprofen, and Flonase as needed for symptomatic relief.  - amoxicillin-clavulanate (AUGMENTIN) 875-125 MG Tab; Take 1 Tablet by mouth 2 times a day for 5 days.  Dispense: 10 Tablet; Refill: 0      Instructed to return to Urgent Care or nearest Emergency Department if symptoms fail to improve, for any change in condition, further concerns, or new concerning symptoms. Patient states understanding of the plan of care and discharge instructions.    Tiffany Deras M.D.

## 2025-02-14 ENCOUNTER — OFFICE VISIT (OUTPATIENT)
Dept: URGENT CARE | Facility: PHYSICIAN GROUP | Age: 40
End: 2025-02-14
Payer: COMMERCIAL

## 2025-02-14 ENCOUNTER — HOSPITAL ENCOUNTER (OUTPATIENT)
Dept: RADIOLOGY | Facility: MEDICAL CENTER | Age: 40
End: 2025-02-14
Attending: NURSE PRACTITIONER
Payer: COMMERCIAL

## 2025-02-14 VITALS
HEIGHT: 68 IN | WEIGHT: 223.99 LBS | DIASTOLIC BLOOD PRESSURE: 78 MMHG | TEMPERATURE: 97.3 F | OXYGEN SATURATION: 93 % | BODY MASS INDEX: 33.95 KG/M2 | RESPIRATION RATE: 16 BRPM | SYSTOLIC BLOOD PRESSURE: 122 MMHG | HEART RATE: 82 BPM

## 2025-02-14 DIAGNOSIS — Z87.09 HISTORY OF ASTHMA: ICD-10-CM

## 2025-02-14 DIAGNOSIS — R05.1 ACUTE COUGH: ICD-10-CM

## 2025-02-14 DIAGNOSIS — R06.2 WHEEZE: ICD-10-CM

## 2025-02-14 DIAGNOSIS — J45.31 MILD PERSISTENT ASTHMA WITH EXACERBATION: ICD-10-CM

## 2025-02-14 PROCEDURE — 94640 AIRWAY INHALATION TREATMENT: CPT | Performed by: NURSE PRACTITIONER

## 2025-02-14 PROCEDURE — 99214 OFFICE O/P EST MOD 30 MIN: CPT | Mod: 25 | Performed by: NURSE PRACTITIONER

## 2025-02-14 PROCEDURE — 71046 X-RAY EXAM CHEST 2 VIEWS: CPT

## 2025-02-14 RX ORDER — PREDNISONE 20 MG/1
20 TABLET ORAL DAILY
Qty: 5 TABLET | Refills: 0 | Status: SHIPPED | OUTPATIENT
Start: 2025-02-14 | End: 2025-02-19

## 2025-02-14 RX ORDER — IPRATROPIUM BROMIDE AND ALBUTEROL SULFATE 2.5; .5 MG/3ML; MG/3ML
3 SOLUTION RESPIRATORY (INHALATION) ONCE
Status: COMPLETED | OUTPATIENT
Start: 2025-02-14 | End: 2025-02-14

## 2025-02-14 RX ADMIN — IPRATROPIUM BROMIDE AND ALBUTEROL SULFATE 3 ML: 2.5; .5 SOLUTION RESPIRATORY (INHALATION) at 15:34

## 2025-02-14 ASSESSMENT — ENCOUNTER SYMPTOMS
NEUROLOGICAL NEGATIVE: 1
CHILLS: 0
SHORTNESS OF BREATH: 1
WHEEZING: 1
FEVER: 0
COUGH: 1
MUSCULOSKELETAL NEGATIVE: 1
CARDIOVASCULAR NEGATIVE: 1
GASTROINTESTINAL NEGATIVE: 1

## 2025-02-14 NOTE — PROGRESS NOTES
Subjective     Nita Jackman is a 39 y.o. female who presents with Cough (Hard cough ,can't get anything out ,sob ,still has s/s from visit on 2/9 sinus pressure ,drainage ,fatigue ,finished antibiotics last night )            Cough  Associated symptoms include ear pain, shortness of breath and wheezing. Pertinent negatives include no chills or fever.   Nita has come into urgent care today as she has been experiencing upper respiratory symptoms x 2 weeks.  She was recently seen in urgent care 5 days ago and was treated for sinus infection.  States still experiencing nasal congestion, postnasal drainage, cough.  History of asthma and experiencing shortness of breath and wheeze.  Does take Flovent twice daily and her albuterol inhaler 2-3x/day.  Has been taking Mucinex multisymptom oral decongestant x 2 weeks.  Has Flonase at home but has not used.  States use of [place] pot makes her symptoms worse.  No salt water gargle.  History of type 1 diabetes.    PMH:  has a past medical history of Asthma, Diabetes, Retinopathy due to secondary diabetes (HCC), and Thyroid disorder.  MEDS:   Current Outpatient Medications:     cloNIDine (CATAPRES) 0.3 MG Tab, TAKE 1 TABLET BY MOUTH DAILY BETWEEN 4PM TO 5 PM, Disp: 90 Tablet, Rfl: 1    gabapentin (NEURONTIN) 600 MG tablet, TAKE 1 TABLET BY MOUTH EVERY EVENING, Disp: 90 Tablet, Rfl: 3    Continuous Blood Gluc Sensor (DEXCOM G6 SENSOR) Misc, , Disp: , Rfl:     Insulin Disposable Pump (OMNIPOD DASH PODS, GEN 4,) Misc, CHANGE POD EVERY 72 HOURS, Disp: , Rfl:     insulin lispro 100 UNIT/ML INJ, Humalog U-100 Insulin 100 unit/mL subcutaneous solution   UNITS INTO OMNIPOD DASH PUMP EVERY 72 HOURS, Disp: , Rfl:     Cetirizine HCl (ZYRTEC ALLERGY) 10 MG Cap, Take  by mouth., Disp: , Rfl:     levothyroxine (SYNTHROID) 125 MCG Tab, TAKE 1 TABLET BY MOUTH EVERY MORNING ON AN EMPTY STOMACH, Disp: 30 tablet, Rfl: 0    escitalopram (LEXAPRO) 10 MG Tab, Take 1 Tablet by mouth  every day., Disp: , Rfl:     insulin aspart (NOVOLOG) 100 UNIT/ML Solution, Inject 1-20 Units under the skin 3 times a day before meals. 1 units for every 10 carb, Disp: , Rfl:     albuterol 108 (90 Base) MCG/ACT Aero Soln inhalation aerosol, Inhale 2 Puffs by mouth every four hours as needed for Shortness of Breath., Disp: 1 Inhaler, Rfl: 3    amoxicillin-clavulanate (AUGMENTIN) 875-125 MG Tab, Take 1 Tablet by mouth 2 times a day for 5 days. (Patient not taking: Reported on 2/14/2025), Disp: 10 Tablet, Rfl: 0  ALLERGIES:   Allergies   Allergen Reactions    Hydrocodone-Acetaminophen     Oxycodone-Acetaminophen     Percocet [Oxycodone-Acetaminophen] Itching     And nausea    Vicodin [Hydrocodone-Acetaminophen] Vomiting     SURGHX:   Past Surgical History:   Procedure Laterality Date    NM LAP,APPENDECTOMY  7/29/2020    Procedure: APPENDECTOMY, LAPAROSCOPIC;  Surgeon: Mac Rouse M.D.;  Location: SURGERY [facility];  Service: General    CARPAL TUNNEL ENDOSCOPIC  5/24/2011    Performed by WAYLON COLEMAN at SURGERY [facility]    VITRECTOMY POSTERIOR  5/4/2009    Performed by DARYL DASILVA at SURGERY SAME DAY [facility]    EYE CRYOSURGERY  5/4/2009    Performed by DARYL DASILVA at SURGERY SAME DAY [facility]    RETINAL DETACHMENT REPAIR  7/11/2008    left/right eye     SOCHX:  reports that she has never smoked. She has never used smokeless tobacco. She reports current alcohol use of about 0.6 oz of alcohol per week. She reports that she does not currently use drugs after having used the following drugs: Marijuana. Frequency: 1.00 time per week.  FH: Family history was reviewed, no pertinent findings to report      Review of Systems   Constitutional:  Positive for malaise/fatigue. Negative for chills and fever.   HENT:  Positive for congestion and ear pain.    Respiratory:  Positive for cough, shortness of breath and wheezing.    Cardiovascular: Negative.    Gastrointestinal: Negative.   "  Musculoskeletal: Negative.    Neurological: Negative.    All other systems reviewed and are negative.             Objective     /78   Pulse 82   Temp 36.3 °C (97.3 °F) (Temporal)   Resp 16   Ht 1.727 m (5' 8\")   Wt 102 kg (223 lb 15.8 oz)   SpO2 93%   BMI 34.06 kg/m²      Physical Exam  Vitals reviewed.   Constitutional:       General: She is awake. She is not in acute distress.     Appearance: Normal appearance. She is not ill-appearing, toxic-appearing or diaphoretic.   HENT:      Right Ear: Ear canal and external ear normal. A middle ear effusion is present.      Left Ear: Ear canal and external ear normal. A middle ear effusion is present.      Nose: Congestion and rhinorrhea present. Rhinorrhea is clear.      Mouth/Throat:      Lips: Pink.      Mouth: Mucous membranes are dry.      Pharynx: Oropharynx is clear. Uvula midline.   Cardiovascular:      Rate and Rhythm: Normal rate.   Pulmonary:      Effort: Pulmonary effort is normal. No tachypnea, accessory muscle usage or respiratory distress.      Breath sounds: No stridor, decreased air movement or transmitted upper airway sounds. Examination of the right-upper field reveals rhonchi. Examination of the left-upper field reveals rhonchi. Examination of the right-middle field reveals wheezing. Examination of the left-middle field reveals wheezing. Examination of the right-lower field reveals wheezing. Examination of the left-lower field reveals wheezing. Wheezing and rhonchi present. No rales.   Skin:     General: Skin is warm and dry.   Neurological:      Mental Status: She is alert and oriented to person, place, and time.   Psychiatric:         Attention and Perception: Attention normal.         Mood and Affect: Mood normal.         Speech: Speech normal.         Behavior: Behavior normal. Behavior is cooperative.                                  Assessment & Plan  Acute cough    Orders:    DX-CHEST-2 VIEWS; Future    ipratropium-albuterol " (DUONEB) nebulizer solution    Wheeze    Orders:    DX-CHEST-2 VIEWS; Future    ipratropium-albuterol (DUONEB) nebulizer solution    History of asthma    Orders:    ipratropium-albuterol (DUONEB) nebulizer solution  -Discontinue Mucinex multisymptom oral decongestant at this time due to overuse and drying effect to mucous membranes  Mild persistent asthma with exacerbation    Orders:    predniSONE (DELTASONE) 20 MG Tab; Take 1 Tablet by mouth every day for 5 days.  -Continue Flovent twice daily  -Recommend to use albuterol inhaler every 6 hours consistently for shortness of breath, chest tightness, wheeze until improved   -Maintain hydration/water intake  -May use over the counter Ibuprofen/Tylenol as needed for any fever, chest discomfort with excessive coughing  -May use humidifier/vaporizer at home as needed for dry cough/nasal passages  -Gargle salt water or throat lozenges as needed for throat irritation/dry cough  -Get rest  -May use daily longer acting antihistamine as needed (no decongestant) for any post nasal drainage   -May use saline nasal spray/Flonase as needed to flush any nasal congestion/post nasal drainage  -Monitor for fevers, worse cough, phlegm, shortness of breath, wheeze, chest tightness- need re-evaluation    -Education provided: see above    -Return to urgent care as needed if new or worsening symptoms  -Patient expresses understanding to treatment and plan of care  -Questions were encouraged and answered  -Recommended over the counter meds were written down when requested   -Advised to follow-up with the primary care provider for recheck, reevaluation, and consideration of further management as needed     -Time spent evaluating this patient was at least 30 minutes. This time comprises of the following: preparing for visit/chart review, patient history taken into account pertinent to symptoms, patient counseling/education for needed treatment outpatient, exam/evaluation/treatment plan  provided, ordering any appropriate lab/test/procedures/meds, independent interpretation of any clinic testing, medication management with any other listed medications and chart documentation.

## 2025-02-24 DIAGNOSIS — E10.42 DIABETIC POLYNEUROPATHY ASSOCIATED WITH TYPE 1 DIABETES MELLITUS (HCC): ICD-10-CM

## 2025-02-24 RX ORDER — CLONIDINE HYDROCHLORIDE 0.3 MG/1
TABLET ORAL
Qty: 90 TABLET | Refills: 0 | Status: SHIPPED | OUTPATIENT
Start: 2025-02-24

## 2025-02-24 NOTE — TELEPHONE ENCOUNTER
Received request via: Pharmacy    Medication Name/Dosage Clonidine (Catapres) 0.3mg tablet     When was medication last prescribed 8/29/24    How many refills were previously provided 1    How many Refills does he patient have left from last prescription 0    Was the patient seen in the last year in this department? Yes   Date of last office visit 6/4/24     Per last Neurology Office Visit, when was the date of next follow up visit set for? 1 year                           Date of office visit follow up request 6/4/25     Does the patient have an upcoming appointment? Yes   If yes, when 4/25/25             If no, schedule appointment Scheduled     Does the patient have long term Plus and need 100 day supply (blood pressure, diabetes and cholesterol meds only)? Patient does not have SCP

## 2025-03-20 ENCOUNTER — HOSPITAL ENCOUNTER (OUTPATIENT)
Dept: RADIOLOGY | Facility: MEDICAL CENTER | Age: 40
End: 2025-03-20
Attending: NURSE PRACTITIONER
Payer: COMMERCIAL

## 2025-03-20 DIAGNOSIS — Z12.31 VISIT FOR SCREENING MAMMOGRAM: ICD-10-CM

## 2025-03-20 PROCEDURE — 77067 SCR MAMMO BI INCL CAD: CPT

## 2025-04-03 PROBLEM — K31.84 GASTROPARESIS: Status: ACTIVE | Noted: 2025-04-03

## 2025-04-04 ENCOUNTER — HOSPITAL ENCOUNTER (OUTPATIENT)
Dept: RADIOLOGY | Facility: MEDICAL CENTER | Age: 40
End: 2025-04-04
Attending: OBSTETRICS & GYNECOLOGY
Payer: COMMERCIAL

## 2025-04-04 DIAGNOSIS — R92.8 ABNORMAL MAMMOGRAM: ICD-10-CM

## 2025-04-04 PROCEDURE — G0279 TOMOSYNTHESIS, MAMMO: HCPCS

## 2025-04-04 PROCEDURE — 76642 ULTRASOUND BREAST LIMITED: CPT | Mod: RT

## 2025-04-25 ENCOUNTER — OFFICE VISIT (OUTPATIENT)
Dept: NEUROLOGY | Facility: MEDICAL CENTER | Age: 40
End: 2025-04-25
Attending: PSYCHIATRY & NEUROLOGY
Payer: COMMERCIAL

## 2025-04-25 VITALS
HEART RATE: 74 BPM | OXYGEN SATURATION: 96 % | SYSTOLIC BLOOD PRESSURE: 110 MMHG | TEMPERATURE: 97.6 F | BODY MASS INDEX: 34.35 KG/M2 | DIASTOLIC BLOOD PRESSURE: 62 MMHG | HEIGHT: 68 IN | WEIGHT: 226.63 LBS

## 2025-04-25 DIAGNOSIS — G25.81 RLS (RESTLESS LEGS SYNDROME): ICD-10-CM

## 2025-04-25 DIAGNOSIS — E10.42 DIABETIC POLYNEUROPATHY ASSOCIATED WITH TYPE 1 DIABETES MELLITUS (HCC): ICD-10-CM

## 2025-04-25 PROCEDURE — 99212 OFFICE O/P EST SF 10 MIN: CPT | Performed by: PSYCHIATRY & NEUROLOGY

## 2025-04-25 RX ORDER — GABAPENTIN 600 MG/1
600 TABLET ORAL EVERY EVENING
Qty: 90 TABLET | Refills: 3 | Status: SHIPPED | OUTPATIENT
Start: 2025-04-25

## 2025-04-25 RX ORDER — CLONIDINE HYDROCHLORIDE 0.3 MG/1
TABLET ORAL
Qty: 90 TABLET | Refills: 3 | Status: SHIPPED | OUTPATIENT
Start: 2025-04-25

## 2025-04-25 ASSESSMENT — PATIENT HEALTH QUESTIONNAIRE - PHQ9
SUM OF ALL RESPONSES TO PHQ QUESTIONS 1-9: 16
5. POOR APPETITE OR OVEREATING: 3 - NEARLY EVERY DAY
CLINICAL INTERPRETATION OF PHQ2 SCORE: 1

## 2025-04-25 ASSESSMENT — ENCOUNTER SYMPTOMS
LOSS OF CONSCIOUSNESS: 0
BACK PAIN: 0
FALLS: 0
SEIZURES: 0
FOCAL WEAKNESS: 0
MEMORY LOSS: 0
TINGLING: 1
TREMORS: 0
SENSORY CHANGE: 1

## 2025-04-25 NOTE — ASSESSMENT & PLAN NOTE
Also clinically stable, ferritin levels do not need to be checked, this is not a primary disorder, likely symptomatic of the neuropathy.  She is not anemic.  We continue the gabapentin.  As long as there is nothing to suggest augmentation or rebound phenomena, She will probably be able to stay on this dose for some time.    Orders:    gabapentin (NEURONTIN) 600 MG tablet; Take 1 Tablet by mouth every evening.

## 2025-04-25 NOTE — ASSESSMENT & PLAN NOTE
Clinically stable, subjectively her symptoms are about the same level of intensity throughout, they have not ascended, the weakness with the left foot is subtle at worst.  She has no new symptom complaints in the upper extremities.  Examination findings today are essentially unchanged from that of a year ago.  We will continue the Catapres.  I am very pleased about all of the above.    Orders:    cloNIDine (CATAPRES) 0.3 MG Tab; TAKE 1 TABLET BY MOUTH DAILY BETWEEN 4PM TO 5 PM    Patient identified as fall risk.  Appropriate orders and counseling given.

## 2025-04-25 NOTE — PROGRESS NOTES
Subjective     Nita Jackman is a 40 y.o. female who presents for annual follow-up, with a history of a diabetic polyneuropathy and symptomatic RLS.    HPI    Neuropathy: Nita states that the neuropathic symptoms in her feet remain at the same level of intensity, still an uncomfortable aching and burning sensation, some of the aching still can ascend up to the knees.  She also now has some numbness over the right buttock, she also has a history of right-sided sciatica.  With this she denies any back pain, there has been no increase in the right lower extremity distal sensory distortions from the neuropathy.  She still has the loss of sensation on the bottoms of both feet, especially to temperature.  She has not been injuring them.  Foot hygiene and care is part of her life, she always wears socks and shoes.    There is still some heaviness with the left foot, she can trip or scruff her few if she is not careful.  Balance is only slightly curtailed, usually with the extremes of uneven surface, subtle incline or decline or reduced ambient light.  He is not falling with any kind of regularity.  She has specialized shoes and inserts to help with balance.    She still has some aching sensations in the hands, this has not changed.  There are no sensory distortions with this, no clumsiness.  She has had a rheumatologic evaluation, systemic sclerosis was suspected but then ruled out.  She is still pending some other nonspecific, serologic autoimmune markers.    She is on Catapres 0.3 mg tablets taken at 6 PM, this provides consistent benefit over 24 hours.    RLS: She denies augmentation or rebound phenomenon, she takes gabapentin 600 mg 2 hours before bed.  There are no issues with edema, weight gain, depression, irritability, dizziness, dysarthria, etc. on the drug itself.  She sleeps the night through.    Medical, surgical and family histories are reviewed, there are no new drug allergies.  A type I diabetic,  "the new GLP medications are not FDA approved, she has yet to find one that she tolerates and which works well for her, Ozempic being her miracle drug.  Even compounding pharmacies can provide the drug at a competitive price for her.    Her  unfortunately got laid off, after 20+ years as a .  She is on Catapres 0.3 mg at 6 PM, Neurontin 6 mg every evening, Synthroid, the OmniPod insulin system, Lexapro, and an albuterol inhaler as needed.    Review of Systems   Musculoskeletal:  Negative for back pain and falls.   Neurological:  Positive for tingling and sensory change. Negative for tremors, focal weakness, seizures and loss of consciousness.   Psychiatric/Behavioral:  Negative for memory loss.    All other systems reviewed and are negative.    Objective     /62 (BP Location: Left arm, Patient Position: Sitting, BP Cuff Size: Adult)   Pulse 74   Temp 36.4 °C (97.6 °F) (Temporal)   Ht 1.727 m (5' 8\")   Wt 103 kg (226 lb 10.1 oz)   SpO2 96%   BMI 34.46 kg/m²      Physical Exam    She appears in no acute distress.  Vital signs are stable.  There is no malar rash or jaw claudication.  The neck is supple.  Cardiac evaluation is unremarkable.  Straight leg raising is negative bilaterally.  Foot hygiene is quite good.  There is no edema.     Neurological Exam    Fully oriented, there is no aphasia or inattention.    PERRLA/EOMI, visual fields are full, facial movements are symmetric.  Sensory exam is intact to light touch.  There is no bulbar dysfunction, shoulder shrug and head rotation are symmetric.    Musculoskeletal exam reveals normal tone throughout, there is no tremor or drift.  Strength is intact throughout, including in the left lower extremity distally.    Reflexes are absent at the ankles, trace present at the knees and elsewhere, there are no asymmetries from side-to-side.  Toes are downgoing.    She stands easily, looks at the ground only occasionally as she walks, station is " still slightly widened.  Armswing is symmetric.  Repetitive movements are little slowed with the left foot, unchanged from last year.  Movements are intact in the other 3 extremities.  There is no appendicular dystaxia.    Sensory exam still reveals the subjective decrement of temperature in the feet, pinprick is intact, vibration diminished in the toes.  Romberg is absent.  Assessment & Plan  Diabetic polyneuropathy associated with type 1 diabetes mellitus (HCC)  Clinically stable, subjectively her symptoms are about the same level of intensity throughout, they have not ascended, the weakness with the left foot is subtle at worst.  She has no new symptom complaints in the upper extremities.  Examination findings today are essentially unchanged from that of a year ago.  We will continue the Catapres.  I am very pleased about all of the above.    Orders:    cloNIDine (CATAPRES) 0.3 MG Tab; TAKE 1 TABLET BY MOUTH DAILY BETWEEN 4PM TO 5 PM    Patient identified as fall risk.  Appropriate orders and counseling given.    RLS (restless legs syndrome)  Also clinically stable, ferritin levels do not need to be checked, this is not a primary disorder, likely symptomatic of the neuropathy.  She is not anemic.  We continue the gabapentin.  As long as there is nothing to suggest augmentation or rebound phenomena, She will probably be able to stay on this dose for some time.    Orders:    gabapentin (NEURONTIN) 600 MG tablet; Take 1 Tablet by mouth every evening.    We will follow-up in 1 year.    Time: 40 minutes in total spent on patient care including pre-charting, record review, discussion with healthcare staff and documentation.  This includes face-to-face time for exam, review, discussion, as well as counseling and coordinating care.

## 2025-08-08 DIAGNOSIS — E10.42 DIABETIC POLYNEUROPATHY ASSOCIATED WITH TYPE 1 DIABETES MELLITUS (HCC): ICD-10-CM

## 2025-08-08 RX ORDER — CLONIDINE HYDROCHLORIDE 0.3 MG/1
TABLET ORAL
Qty: 90 TABLET | Refills: 3 | Status: SHIPPED | OUTPATIENT
Start: 2025-08-08

## 2025-08-14 ENCOUNTER — OFFICE VISIT (OUTPATIENT)
Dept: URGENT CARE | Facility: PHYSICIAN GROUP | Age: 40
End: 2025-08-14
Payer: COMMERCIAL

## 2025-08-14 VITALS
DIASTOLIC BLOOD PRESSURE: 76 MMHG | RESPIRATION RATE: 14 BRPM | WEIGHT: 219 LBS | HEART RATE: 77 BPM | BODY MASS INDEX: 33.19 KG/M2 | OXYGEN SATURATION: 95 % | HEIGHT: 68 IN | TEMPERATURE: 98.2 F | SYSTOLIC BLOOD PRESSURE: 118 MMHG

## 2025-08-14 DIAGNOSIS — J02.9 PHARYNGITIS, UNSPECIFIED ETIOLOGY: Primary | ICD-10-CM

## 2025-08-14 LAB
FLUAV RNA SPEC QL NAA+PROBE: NEGATIVE
FLUBV RNA SPEC QL NAA+PROBE: NEGATIVE
RSV RNA SPEC QL NAA+PROBE: NEGATIVE
S PYO DNA SPEC NAA+PROBE: NOT DETECTED
SARS-COV-2 RNA RESP QL NAA+PROBE: NEGATIVE

## 2025-08-14 PROCEDURE — 99213 OFFICE O/P EST LOW 20 MIN: CPT | Performed by: STUDENT IN AN ORGANIZED HEALTH CARE EDUCATION/TRAINING PROGRAM

## 2025-08-14 PROCEDURE — 87651 STREP A DNA AMP PROBE: CPT | Performed by: STUDENT IN AN ORGANIZED HEALTH CARE EDUCATION/TRAINING PROGRAM

## 2025-08-14 PROCEDURE — 3078F DIAST BP <80 MM HG: CPT | Performed by: STUDENT IN AN ORGANIZED HEALTH CARE EDUCATION/TRAINING PROGRAM

## 2025-08-14 PROCEDURE — 87637 SARSCOV2&INF A&B&RSV AMP PRB: CPT | Performed by: STUDENT IN AN ORGANIZED HEALTH CARE EDUCATION/TRAINING PROGRAM

## 2025-08-14 PROCEDURE — 3074F SYST BP LT 130 MM HG: CPT | Performed by: STUDENT IN AN ORGANIZED HEALTH CARE EDUCATION/TRAINING PROGRAM

## (undated) DEVICE — STAPLER 45MM ARTICULATING - ENDO (3EA/BX)

## (undated) DEVICE — SENSOR SPO2 NEO LNCS ADHESIVE (20/BX) SEE USER NOTES

## (undated) DEVICE — SUTURE 4-0 MONOCRYL PLUS PS-2 - 27 INCH (36/BX)

## (undated) DEVICE — SET EXTENSION WITH 2 PORTS (48EA/CA) ***PART #2C8610 IS A SUBSTITUTE*****

## (undated) DEVICE — DETERGENT RENUZYME PLUS 10 OZ PACKET (50/BX)

## (undated) DEVICE — MASK ANESTHESIA ADULT  - (100/CA)

## (undated) DEVICE — TOWELS CLOTH SURGICAL - (4/PK 20PK/CA)

## (undated) DEVICE — ELECTRODE 850 FOAM ADHESIVE - HYDROGEL RADIOTRNSPRNT (50/PK)

## (undated) DEVICE — TROCAR LAPSCP 100MM 12MM NTHRD - (6/BX)

## (undated) DEVICE — SLEEVE, VASO, THIGH, MED

## (undated) DEVICE — GOWN SURGEONS X-LARGE - DISP. (30/CA)

## (undated) DEVICE — ELECTRODE DUAL RETURN W/ CORD - (50/PK)

## (undated) DEVICE — GLOVE BIOGEL PI ORTHO SZ 6 1/2 SURGICAL PF LF (40PR/BX)

## (undated) DEVICE — GLOVE BIOGEL PI INDICATOR SZ 8.0 SURGICAL PF LF -(50/BX 4BX/CA)

## (undated) DEVICE — CANISTER SUCTION 3000ML MECHANICAL FILTER AUTO SHUTOFF MEDI-VAC NONSTERILE LF DISP  (40EA/CA)

## (undated) DEVICE — KIT ANESTHESIA W/CIRCUIT & 3/LT BAG W/FILTER (20EA/CA)

## (undated) DEVICE — SET LEADWIRE 5 LEAD BEDSIDE DISPOSABLE ECG (1SET OF 5/EA)

## (undated) DEVICE — CANNULA W/SEAL 5X100 Z-THRE - ADED KII (12/BX)

## (undated) DEVICE — LACTATED RINGERS INJ 1000 ML - (14EA/CA 60CA/PF)

## (undated) DEVICE — SUTURE GENERAL

## (undated) DEVICE — TUBING CLEARLINK DUO-VENT - C-FLO (48EA/CA)

## (undated) DEVICE — PROTECTOR ULNA NERVE - (36PR/CA)

## (undated) DEVICE — HEAD HOLDER JUNIOR/ADULT

## (undated) DEVICE — BLADE SURGICAL #15 - (50/BX 3BX/CA)

## (undated) DEVICE — GOWN WARMING STANDARD FLEX - (30/CA)

## (undated) DEVICE — GLOVE SZ 7.5 BIOGEL PI MICRO - PF LF (50PR/BX)

## (undated) DEVICE — LIGASURE 5MM BLUNT TIP LONG - 44CM (6EA/PK)

## (undated) DEVICE — NEPTUNE 4 PORT MANIFOLD - (20/PK)

## (undated) DEVICE — SUCTION INSTRUMENT YANKAUER BULBOUS TIP W/O VENT (50EA/CA)

## (undated) DEVICE — SODIUM CHL IRRIGATION 0.9% 1000ML (12EA/CA)

## (undated) DEVICE — PACK LAP CHOLE OR - (2EA/CA)

## (undated) DEVICE — CHLORAPREP 26 ML APPLICATOR - ORANGE TINT(25/CA)

## (undated) DEVICE — STAPLE 45MM BLUE 4.5MM (12EA/BX)

## (undated) DEVICE — TROCAR 5X100 NON BLADED Z-TH - READ KII (6/BX)

## (undated) DEVICE — TUBE E-T HI-LO CUFF 7.0MM (10EA/PK)

## (undated) DEVICE — BAG RETRIEVAL 10ML (10EA/BX)

## (undated) DEVICE — SYSTEM CLEARIFY VISUALIZATION (10EA/PK)

## (undated) DEVICE — SUTURE 0 VICRYL PLUS UR-6 - 27 INCH (36/BX)